# Patient Record
(demographics unavailable — no encounter records)

---

## 2024-10-23 NOTE — REVIEW OF SYSTEMS
[Fatigue] : fatigue [SOB on Exertion] : shortness of breath during exertion [Negative] : Allergic/Immunologic [Recent Change In Weight] : ~T no recent weight change [Shortness Of Breath] : no shortness of breath [Wheezing] : no wheezing [Cough] : no cough [Skin Rash] : no skin rash [FreeTextEntry2] : occasional  [FreeTextEntry6] : wheeze and cough- improved

## 2024-10-23 NOTE — ASSESSMENT
[Palliative] : Goals of care discussed with patient: Palliative [FreeTextEntry1] : 68 yo m with Stage IIIb sq cell ca, PDL1 0%.  -s/p CCRT -started maintenance therapy with durvalumab 2/25/20.Developed vesicular rash originally thought to be shingles. Was treated with valacyclovir. This then progressed to disseminated plaque and blister rash which was painful. Dx with bullous pemphigoid. Pt was started on Dupixent by derm with improvement in symptoms. Follow up with derm as scheduled. Pt continues on Dupixent every 2 weeks and is off steroids. Subsequently developed COVID in January and was hospitalized 1/21/21-1/26/21.  Immunotherapy permanently discontinued given severe AEs Scans done 4/15/21 as follows Unchanged right hilar mass and nodular right upper lobe mass. Minimal residual ground glass opacities and interlobular septal thickening improved since 01/22/2021. Emphysema.  Indeterminant right renal hypodensity. Suggest further evaluation with MRI. Infrarenal abdominal aortic aneurysm Pt was lost to follow up after that He is here now after being diagnosed with recurrent disease He was admitted to [facility] on 4/24 with acute hypoxic resp failure- imaging moore demonstrated increased size of R perihilar mass a/w severe narrowing of the R mainstem bronchus. Interventional Pulm was consulted s/p Flex Bronch 4/25 s/p balloon dilation, tumor debunking and BI [id number] Bonastent placement; EBUS guided R hilar mass bx done 4/25/24 bx of right hilar mass consistent with NSCLC, squamous cell carcinoma. PET/CT and MR head reviewed- FDG avid hilar/med mass, no other areas of disease He was started on carbo (retreat) AUC 4 plus Taxol 175 mg/m2 in May 2024 Pt has completed 4 cycles of carbo (retreat) AUC 4 plus Taxol 175 mg/m2  CT scans from this month show decreased size of the right hilar mass.  Case presented at  Palliative intent RT was recommended with goal of locoregional disease control Ideally, this should be followed by IO. 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  For now will hold off on IO.  Plan to obtain PET-CT in December. If there are new areas of disease, will consider IO.  Follow up with Dr. Bradshaw after RT completes Cough; continue benzonatate to 200mg TID. Will try hycodon as well  Fatigue: Voice also sounds a bit raspy. Will obtain RPP to r/o URI   OV in 4 weeks

## 2024-10-23 NOTE — SURGICAL HISTORY
patient's belongings returned [PSH Reviewed and Updated] : past surgical history reviewed and updated

## 2024-10-23 NOTE — PHYSICAL EXAM
[Fully active, able to carry on all pre-disease performance without restriction] : Status 0 - Fully active, able to carry on all pre-disease performance without restriction [Normal] : affect appropriate [de-identified] : Patchy healing rash

## 2024-10-24 NOTE — DATA REVIEWED
[FreeTextEntry1] : CT scan 7/26/24   IMPRESSION: 1.  Interval decrease in size of the obstructive right hilar mass measuring 5.5 x 4.0 cm, previously 6.8 x 4.3 cm on 4/24/2024. 2.  New complete consolidation of the right upper and middle lobe. Right middle lobe consolidation with volume loss, likely represents postobstructive atelectasis. The consolidation of the right upper lobe however demonstrates heterogeneous enhancement with lobular configuration. Differentials include postobstructive lung field with fluid/pneumonia or tumor extension. 3.  Stable partially thrombosed infrarenal abdominal aortic aneurysm.

## 2024-10-24 NOTE — HISTORY OF PRESENT ILLNESS
[Patient is stable] : patient is stable [FreeTextEntry2] : 68 yo man, former heavy smoker (100 py), with h/o COPD, Covid-19 infection, HTN, GERD, anxiety/depression, and stage 4 T4, N3, M1  SCC of the lung.   Lung Ca- dx in 2019 with dominant R hilar mass with encasement of R pulm arteriesta rad and chemo then Durvalumab.  4/24 hospitalized with acute hypoxic resp failure- imaging moore demonstrated increased size of R perihilar mass a/w severe narrowing of the R mainstem bronchus s/p balloon dilation, tumor debunking and BI [id number] Bonastent placement;  infxn treated for pneumonia Finished RT x5 treatments. COPD- on Breo and duonebs. Has home O2, not needing frequently.  Hydrocodone cough syrup not covered. Tylenol #3 was helpful for cough, needs refill Poor appetite. Lost 5 lb, unintentional Had repeat bronchosopy-- showed inflamation and mucus plug.    Still independent in ADL's

## 2024-10-24 NOTE — HISTORY OF PRESENT ILLNESS
[Patient is stable] : patient is stable [FreeTextEntry2] : 70 yo man, former heavy smoker (100 py), with h/o COPD, Covid-19 infection, HTN, GERD, anxiety/depression, and stage 4 T4, N3, M1  SCC of the lung.   Lung Ca- dx in 2019 with dominant R hilar mass with encasement of R pulm arteriesta rad and chemo then Durvalumab.  4/24 hospitalized with acute hypoxic resp failure- imaging moore demonstrated increased size of R perihilar mass a/w severe narrowing of the R mainstem bronchus s/p balloon dilation, tumor debunking and BI [id number] Bonastent placement;  infxn treated for pneumonia Finished RT x5 treatments. COPD- on Breo and duonebs. Has home O2, not needing frequently.  Hydrocodone cough syrup not covered. Tylenol #3 was helpful for cough, needs refill Poor appetite. Lost 5 lb, unintentional Had repeat bronchosopy-- showed inflamation and mucus plug.    Still independent in ADL's

## 2024-10-24 NOTE — PHYSICAL EXAM
[No Acute Distress] : no acute distress [Normal Voice/Communication] : normal voice communication [Normal Outer Ear/Nose] : the ears and nose were normal in appearance [No JVD] : no jugular venous distention [No Respiratory Distress] : no respiratory distress [Normal Rate] : heart rate was normal  [Regular Rhythm] : with a regular rhythm [Normal Bowel Sounds] : normal bowel sounds [Non Tender] : non-tender [Normal Gait] : normal gait [No Rash] : no rash [No Skin Lesions] : no skin lesions [Oriented x3] : oriented to person, place, and time [Normal Affect] : the affect was normal [de-identified] : no wheezing, Bilateral air entry [de-identified] : no murmur, no edema

## 2024-10-24 NOTE — PHYSICAL EXAM
History   Chief Complaint:  Chest Pain       The history is provided by the patient.      Fouzia Arenas is a 57 year old female with history of hypertension who presents with chest pain. The patient was recently seen at [facility] on 8/24 for chest pain and shortness of breath on exertion where labs including trop and D dimer were found to be normal. She also took a stress test today that was found to be negative with an EF of 76%. Several hours after the stress test today, the patient began experiencing nausea with centralized chest pain/ heaviness and around 1900 while making dinner,  She also notes that about that same time she walked across the house to get her phone and felt extremely short of breath which prompted her to call her friend. She states that the symptoms are similar to what brought her in to the ED in August. She denies any cough or fever.      Review of Systems   Constitutional: Negative for fever.   Respiratory: Positive for shortness of breath. Negative for cough.         (+) chest heaviness   Cardiovascular: Positive for chest pain.   Gastrointestinal: Positive for nausea.   All other systems reviewed and are negative.      Allergies:  Amoxicillin  Hctz [Hydrochlorothiazide]  Percocet [Oxycodone-Acetaminophen]    Medications:  Zestril  Antivert  Myrbetriq  Bystolic  Aldactone  Imitrex  Cholecalciferol    Past Medical History:     Esophageal reflux  Palpitations  STORMY  BPPV  Morbid obesity  Vertigo  Hypertension  SVT  Lipodermatosclerosis of both lower extremities  Migraine     Past Surgical History:    Bladder surgery  Cholecystectomy  Colonoscopy  Cystoscopy, sling transvaginal  D&C  EGD  Salpingectomy  Laser ablation vein varicose  Hysterectomy     Family History:    Mother - hypertension, lipids, alzheimer disease  Father - hypertension, prostate cancer, lipids    Social History:  Patient came from home.  Patient is accompanied in the ED with friend.  PCP: Ruthie Xiong      Physical Exam     Patient Vitals for the past 24 hrs:   BP Temp Temp src Pulse Resp SpO2 Weight   09/14/22 0200 (!) 157/99 98.1  F (36.7  C) Oral (!) 48 14 97 % --   09/14/22 0100 (!) 150/84 -- -- (!) 46 17 96 % --   09/14/22 0000 (!) 153/76 -- -- (!) 46 19 95 % --   09/13/22 2328 (!) 150/87 97.7  F (36.5  C) Oral (!) 48 18 99 % --   09/13/22 2034 (!) 172/92 97.9  F (36.6  C) Oral 65 20 100 % 145.2 kg (320 lb)       Physical Exam  General/Appearance: appears stated age, well-groomed, appears comfortable  Eyes: EOMI, no scleral injection, no icterus  ENT: MMM  Neck: supple, nl ROM, no stiffness  Cardiovascular: RRR, nl S1S2, no m/r/g, 2+ pulses in all 4 extremities, cap refill <2sec  Respiratory: CTAB, good air movement throughout, no wheezes/rhonchi/rales, no increased WOB, no retractions  GI: abd soft, non-distended, nttp,  no HSM, no rebound, no guarding, nl BS  MSK: STEVEN, good tone, no bony abnormality  Skin: warm and well-perfused, no rash, no edema, no ecchymosis, nl turgor  Neuro: GCS 15, alert and oriented, no gross focal neuro deficits  Psych: interacts appropriately  Heme: no petechia, no purpura, no active bleeding        Emergency Department Course   ECG  ECG results from 09/13/22   EKG 12-lead, tracing only     Value    Systolic Blood Pressure     Diastolic Blood Pressure     Ventricular Rate 64    Atrial Rate 64    ME Interval 186    QRS Duration 94        QTc 429    P Axis 57    R AXIS -18    T Axis -12    Interpretation ECG      Sinus rhythm with marked sinus arrhythmia  Low voltage QRS  Cannot rule out Anterior infarct (cited on or before 13-SEP-2022)  T wave abnormality, consider inferior ischemia  Abnormal ECG  When compared with ECG of 13-SEP-2022 11:10,  Questionable change in QRS axis  Confirmed by GENERATED REPORT, COMPUTER ([id number]),  Aasen, Bradley ([id number]) on 9/13/2022 8:42:54 PM         Imaging:  XR Chest 2 Views   Final Result   IMPRESSION: Lungs are clear. No pleural effusion or  pneumothorax.      Upper limits of normal heart size.        Report per radiology    Laboratory:  Labs Ordered and Resulted from Time of ED Arrival to Time of ED Departure   BASIC METABOLIC PANEL - Abnormal       Result Value    Sodium 137      Potassium 4.2      Chloride 105      Carbon Dioxide (CO2) 26      Anion Gap 6      Urea Nitrogen 16      Creatinine 0.99      Calcium 9.2      Glucose 125 (*)     GFR Estimate 66     CBC WITH PLATELETS AND DIFFERENTIAL - Abnormal    WBC Count 7.7      RBC Count 5.11      Hemoglobin 14.4      Hematocrit 46.1      MCV 90      MCH 28.2      MCHC 31.2 (*)     RDW 12.5      Platelet Count 267      % Neutrophils 52      % Lymphocytes 37      % Monocytes 8      % Eosinophils 2      % Basophils 1      % Immature Granulocytes 0      NRBCs per 100 WBC 0      Absolute Neutrophils 4.0      Absolute Lymphocytes 2.9      Absolute Monocytes 0.6      Absolute Eosinophils 0.1      Absolute Basophils 0.1      Absolute Immature Granulocytes 0.0      Absolute NRBCs 0.0     TROPONIN I - Normal    Troponin I High Sensitivity 7     D DIMER QUANTITATIVE - Normal    D-Dimer Quantitative <0.27     NT PROBNP INPATIENT - Normal    N terminal Pro BNP Inpatient 297     TROPONIN I - Normal    Troponin I High Sensitivity 7        Emergency Department Course:       Reviewed:  I reviewed nursing notes, vitals, past medical history and Care Everywhere    Assessments:  2254 I obtained history and examined the patient as noted above.   0303 I rechecked the patient and explained findings.     Disposition:  The patient was discharged to home.     Impression & Plan     CMS Diagnoses: None    Medical Decision Making:  This patient is a pleasant 57-year-old female who presents with several hours of chest pain/heaviness as well as some dyspnea on exertion today.  This occurs in light of a normal stress test that she had earlier today with an EF of approximately 76%.  Here to high-sensitivity delta troponins were  unremarkable as was her EKG.  Chest x-ray was clear.  D-dimer was negative.  Clinically she is stable.  At this point in time I do not know was causing her symptoms and do want her to follow-up with her PCP as they may ultimately feel is important that she sees cardiology but from an ER standpoint I think we have looked for and evaluated for life-threatening causes, rule these out, and it safe for her to be sent home with supportive care.  The patient feels comfortable with these plans.  Diagnosis:    ICD-10-CM    1. Chest pain, unspecified type  R07.9    2. SOB (shortness of breath)  R06.02        Discharge Medications:  New Prescriptions    No medications on file       Scribe Disclosure:  I, Nick Lozano, am serving as a scribe at 10:52 PM on 9/13/2022 to document services personally performed by Peggy Espitia MD based on my observations and the provider's statements to me.          Peggy Espitia MD  09/14/22 0787     [No Acute Distress] : no acute distress [Normal Voice/Communication] : normal voice communication [Normal Outer Ear/Nose] : the ears and nose were normal in appearance [No JVD] : no jugular venous distention [No Respiratory Distress] : no respiratory distress [Normal Rate] : heart rate was normal  [Regular Rhythm] : with a regular rhythm [Normal Bowel Sounds] : normal bowel sounds [Non Tender] : non-tender [Normal Gait] : normal gait [No Rash] : no rash [No Skin Lesions] : no skin lesions [Oriented x3] : oriented to person, place, and time [Normal Affect] : the affect was normal [de-identified] : no wheezing, Bilateral air entry [de-identified] : no murmur, no edema

## 2024-10-24 NOTE — REASON FOR VISIT
[Follow-Up] : a follow-up visit [Pre-Visit Preparation] : pre-visit preparation was done [FreeTextEntry1] : lung CA [FreeTextEntry2] : reviewed chart

## 2024-10-24 NOTE — HEALTH RISK ASSESSMENT
[Independent] : feeding [No falls in past year] : Patient reported no falls in the past year [No] : The patient does not have visual impairment [TimeGetUpGo] : 10

## 2024-10-24 NOTE — ASSESSMENT
[FreeTextEntry1] : Discussed ACP- pt wants trial CPR. Undecided about rest of MOLST. Pt has not yet discussed with family, kiki brother who is HCP.  Will address next visit

## 2024-10-30 NOTE — REVIEW OF SYSTEMS
[Dyspepsia: Grade 0] : Dyspepsia: Grade 0 [Dysphagia: Grade 0] : Dysphagia: Grade 0 [Esophagitis: Grade 0] : Esophagitis: Grade 0 [Nausea: Grade 0] : Nausea: Grade 0 [Vomiting: Grade 0] : Vomiting: Grade 0 [Fatigue: Grade 1 - Fatigue relieved by rest] : Fatigue: Grade 1 - Fatigue relieved by rest [Cough: Grade 2 - Moderate symptoms, medical intervention indicated; limiting instrumental ADL] : Cough: Grade 2 - Moderate symptoms, medical intervention indicated; limiting instrumental ADL [Dyspnea: Grade 2 - Shortness of breath with minimal exertion; limiting instrumental ADL] : Dyspnea: Grade 2 - Shortness of breath with minimal exertion; limiting instrumental ADL [Hoarseness: Grade 1 - Mild or intermittent voice change; fully understandable; self-resolves] : Hoarseness: Grade 1 - Mild or intermittent voice change; fully understandable; self-resolves [Hypoxia: Grade 2 - Decreased oxygen saturation with exercise (e.g., pulse oximeter <88%); intermittent supplemental oxygen] : Hypoxia: Grade 2 - Decreased oxygen saturation with exercise (e.g., pulse oximeter <88%); intermittent supplemental oxygen [Pruritus: Grade 0] : Pruritus: Grade 0 [Skin Hyperpigmentation: Grade 0] : Skin Hyperpigmentation: Grade 0 [Dermatitis Radiation: Grade 0] : Dermatitis Radiation: Grade 0 [FreeTextEntry1] : continues w/o change  [FreeTextEntry2] : uses home O2 @ 2 LPM as needed [FreeTextEntry5] : has home 02 as needed

## 2024-10-30 NOTE — HISTORY OF PRESENT ILLNESS
[FreeTextEntry1] : 6/19/24: Initial consultation:  Clinically stable s/p stent for Rt perihilar recurrence and currently on systemic therapy. We discussed possible palliative radiation for local control, but given stable clinical findings currently, will continue on systemic therapy and await f/u scans first before finalizing any further radiation vs observation.  8/15/24: Since prior visit has had a positive response to systemic therapy without progression although with post-obstructive atelectasis. Given this, we discussed adding radiation therapy for local control, although further radiation is limited given the prior treatment in the past. He wishes to do as much as possible, so planned for 20Gy/5fx which he received from 9/13/2024-9/19/2024.  10/30/2024: Patient presents to clinic for follow-up s/p SBRT [id number] to the right lung completed on 9/19/2024. He had an XRAY  of the chest performed on 10/23/2024 Impression: No acute pulmonary disease or interval change. s/p bronch 9/25/2024 RLL, pathology neg for malignant cells.  Coughing continues, denies fevers/chills. Finds Benzonatate that was prescribed helpful. Using nebulizer twice daily. States his SOB/GARCIAS are at his baseline without change. Continues to use home O2 as needed but states he uses it infrequently.

## 2024-11-19 NOTE — REVIEW OF SYSTEMS
[Fatigue] : fatigue [SOB on Exertion] : shortness of breath during exertion [Negative] : Allergic/Immunologic [Recent Change In Weight] : ~T no recent weight change [Shortness Of Breath] : no shortness of breath [Wheezing] : no wheezing [Cough] : no cough [Skin Rash] : no skin rash [FreeTextEntry2] : occasional  [FreeTextEntry6] : wheeze and cough- improved [de-identified] : as above

## 2024-11-19 NOTE — HISTORY OF PRESENT ILLNESS
[Disease: _____________________] : Disease: [unfilled] [T: ___] : T[unfilled] [N: ___] : N[unfilled] [M: ___] : M[unfilled] [AJCC Stage: ____] : AJCC Stage: [unfilled] [Date: ____________] : Patient's last distress assessment performed on [unfilled]. [8 - Distress Level] : Distress Level: 8 [de-identified] : 69 year old male, with past history significant for Hypertension (untreated), GERD, Anxiety, Depression and Smoking (active), COPD, presented to the ED at [facility] on 9/5/19 with progressive shortness of breath and cough - with wheezing and occasional hemoptysis for the past ~ one month. Reports pleuritic type pains of the right lower chest and back with coughing and repositioning of the body. Patient has had an unquantified weight loss over several months (5 lbs per patient, 10 lbs per nephew). CXR at [facility] showed a lung mass. CTA of chest significant for "5.5 x 4.7 cm sized right hilar mass with mediastinal invasion. Encasement of right pulmonary artery and lobar branches as well as central airways with mucosal plug. Possible endobronchial spread to the right upper lobe." Pt was transferred to [facility] for thoracic surgery eval. Patient was seen by thoracic surgery and deemed not a surgical candidate. CT A/P showed scattered liver lesions. Pulmonology was consulted and EBUS with biopsy of the lung mass was performed on 9/10. Rapid cytology showed malignant cells. Further IHC analysis showed P40 pos and TTF1 negative, confirming sq cell histology. PDL1 was 0%.  Of note, brain MRI was normal.  Since discharge, pt has been feeling tired, and continues to have cough and dyspnea. He is trying to maintaining weight.   Pt is a recent smoker. Smoked 2 ppd of cigarettes for ~ 50 years. Last cigarette was on 9/5- has Nictonine patch now. Pt has not seen a doctor for 25 years prior to the recent admission and has not had any prior CT scans.   10/3/19: Not smoking., Started chemo on Tuesday., Feels better already- still some cough. Gained weight. No AE  10/22/19: Pt feeling well. Continued cough. Small weight loss. States appetite fluctuates. Not smoking. Difficulty sleeping due to anxiety and worry.   11/14/19: Pt returns for follow-up. He has completed 7 weeks of carbo/abraxane and is scheduled for XRT sim tomorrow. He will begin CCRT with same agents. He is feeling well. Gained weight. MIld fatigue. Cough improved. denies pain  1/31/2020: Pt returns for follow up. He completed CCRT on 1/3/2020. States he is feeling well. Denies pain/cough/SOB. Mild residual fatigue from time to time. Reports he QUIT SMOKING!!! It has been 2 months since he had a cigarette. States appetite is good despite no weight gain.   5/5/2020: Pt being seen in treatment room today. He is feeling very well. Offers no complaints. Has been on durvalumab since 2/25/2020 and is tolerating welll. Due to Covid Pandemic regimen has been changed to Q month schedule. He denies cough, SOB, headaches, visual changes. S/P CCRT on 1/3/2020.  6/16/20: No new complaints. HAd CT scans last week, Creatinine elevated on recent blood tests. Pt denies any NSAIDs  7/14/2020: Pt returns for follow up and treatment. He is doing well. Tolerating tx wo any iAE. Pt has been on Durvalumab since 2/25/2020. He will complete one year of therapy. Last imaging in June. No new complaints. ROS: non-contributory.   8/11/20: No complaints. Creatinine still elevated. Saw renal and bx was recommended to r/o possible immune related kidney injury  11/3/2020: Pt doing well. Complains of chronic heartburn. Using rolaids/tums. States has no follow up scheduled with nephrology. Pt states has never had an endoscopy or colonoscopy.   12/1/2020: Pt returns for follow up and discussion of findings on recent imaging. He states he is feeling well but has developed a rash on his RUE that is painful and itchy.   12/29/2020: Pt returns for follow up. Last immunotherapy held due to possible irAE skin toxicity. Started on prednisone 40mg daily. Pt has since developed severe plaque/blister rash. Saw derm had biopsy...results pending. Pt developed profound SOB/ Cough. No fevers.  4/8/21: Pt returns for follow up. Since last visit patient has had multiple admissions to the hospital. In dec he was admitted for severe immune-mediated skin and lung toxicity. He was seen by derm and diagnosed with bullous pemphigoid. He continues to follow with derm and was recently started on Dupixant. He remains on Prednisone 10mg daily. Shortly after discharge he developed severe COVID infection and was hospitalized again 1/21/21-1/26/21. He was evaluated by the [facility] but has not maintained follow up. She continues to struggle with profound fatigue and significant GARCIAS. He has been off immunotherapy since 12/15/20.   5/13/21: Cutaneous lesions have markedly improved. Continues to follow with Dr. Bradshaw. Gained weight. Feels well. No complaints  9/17/21: Cutaneous lesions continue to improve- right now just some induration and discoloration. Appetite good and gained weight.   5/17/24: Pt was lost to follow up. He was most recently admitted to [facility] on 4/24 with acute hypoxic resp failure- imaging moore demonstrated increased size of R perihilar mass a/w severe narrowing of the R mainstem bronchus. Interventional Pulm was consulted s/p Flex Bronch 4/25 s/p balloon dilation, tumor debunking and BI [id number] Bonastent placement; EBUS guided R hilar mass bx done. bx of right hilar mass consistent with NSCLC, squamous cell carcinoma.  CTa chest 4/25 showed a 6.8 x 4.3 cm R perihilar mass that had increased in size. CT A/P from 5/1 showed no evidence disease in the abdomen or pelvis.  Seen by rad onc inpatient and nothing to be done at that time, outpatient palliative RT. Scheduled to see med onc 5/17/24. He complains of fatigue, weakness and mild intermittent discomfort in the R chest. He also complains of a productive cough, but improved sob and wheeze. He denies hemoptysis and dysphagia, currently tolerating diet well.    6/14/24: Pt is feeling well. he has decreased appetite and has lost some weight. cough and dyspnea improved. Voice has improved/   7/5/24: Patient seen today for follow up while receiving treatment. He reports some fatigue and constipation that is relieved with docusate following chemo. He has also been having cough. Last week he had bronch and stent was cleaned. Appetite is stable, no N/V/D.   7/26/24: Patient seen today for follow up while receiving treatment. He reports overall feeling well. Breathing is stable and he denies N/V/D.   8/15/24: Doing well. Feeling better. Respiratory symptoms have improved.. He has gained weight. Completed 4 cycle of chemo (Carbo/taxol)  9/24/24: Patient seen today for follow up. He has completed 5 frx of RT to the right lung. He reports experiencing fatigue following RT. He continues to have cough despite benzonatate. He experiences SOB when coughing but otherwise breathing is stable. He denies N/V/D, F/C.   10/23/24: Patient seen today for follow up. He reports that he is having a slight intermittent discomfort on the anterior right chest wall that predated RT. He has ongoing cough benzonatate is somewhat helpful. He reports feeling fatigue and tired the past two days but denies rhinorrhea, sore throat, fever, chills, changes in his breathing. He reached out to pulm yesterday and was ordered a CXR which he will have done after our visit today.   11/19/24: Pt here today for follow up. He was recently hospitalized post fall and trauma to head. He underwent some work up. PE study which was negative. CT head with no acute findings. He is home now, and has not had recurrence of the symptoms.  [de-identified] : sq cell ca

## 2024-11-19 NOTE — ASSESSMENT
[Palliative] : Goals of care discussed with patient: Palliative [FreeTextEntry1] : 68 yo m with Stage IIIb sq cell ca, PDL1 0%.  -s/p CCRT -started maintenance therapy with durvalumab 2/25/20.Developed vesicular rash originally thought to be shingles. Was treated with valacyclovir. This then progressed to disseminated plaque and blister rash which was painful. Dx with bullous pemphigoid. Pt was started on Dupixent by derm with improvement in symptoms. Follow up with derm as scheduled. Pt continues on Dupixent every 2 weeks and is off steroids. Subsequently developed COVID in January and was hospitalized 1/21/21-1/26/21.  Immunotherapy permanently discontinued given severe AEs Scans done 4/15/21 as follows Unchanged right hilar mass and nodular right upper lobe mass. Minimal residual ground glass opacities and interlobular septal thickening improved since 01/22/2021. Emphysema.  Indeterminant right renal hypodensity. Suggest further evaluation with MRI. Infrarenal abdominal aortic aneurysm Pt was lost to follow up after that He is here now after being diagnosed with recurrent disease He was admitted to [facility] on 4/24 with acute hypoxic resp failure- imaging moore demonstrated increased size of R perihilar mass a/w severe narrowing of the R mainstem bronchus. Interventional Pulm was consulted s/p Flex Bronch 4/25 s/p balloon dilation, tumor debunking and BI [id number] Bonastent placement; EBUS guided R hilar mass bx done 4/25/24 bx of right hilar mass consistent with NSCLC, squamous cell carcinoma. PET/CT and MR head reviewed- FDG avid hilar/med mass, no other areas of disease He was started on carbo (retreat) AUC 4 plus Taxol 175 mg/m2 in May 2024 Pt has completed 4 cycles of carbo (retreat) AUC 4 plus Taxol 175 mg/m2  CT scans from this month show decreased size of the right hilar mass.  Case presented at  Palliative intent RT was recommended with goal of locoregional disease control Ideally, this should be followed by IO. 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  For now will hold off on IO.  Plan to obtain PET-CT in December. If there are new areas of disease, will consider IO.  Follow up with Dr. Bradshaw after RT completes Cough; continue benzonatate to 200mg TID. Will try hycodon as well  Fatigue: Voice also sounds a bit raspy. Will obtain RPP to r/o URI   OV in 4 weeks   11/19/24:  Pt completed RT, 20Gy/5fx which he received from 9/13/2024-9/19/2024. Was gradually improving but had a syncopal event 11/15. No seizures, urinary or fecal incontinence during the episode.  Was hosptialized I reviewed the EMR in its entirety including notes from other providers, labs, path, and scan reports I reviewed imaging studies independently CTA showed no PE CT head showed no bleed or other acute pathologies Pt to follow up with pulm- has persistent dyspne Syncope- will need to r/o cardiac etiology. Will get echo- refer to cardiology Refer to neuro given concern that this could be neurological MR head ASAP Next scan would be in Jan and should be PET/CT OV in 3 weeks

## 2024-11-19 NOTE — PHYSICAL EXAM
[Normal] : affect appropriate [Restricted in physically strenuous activity but ambulatory and able to carry out work of a light or sedentary nature] : Status 1- Restricted in physically strenuous activity but ambulatory and able to carry out work of a light or sedentary nature, e.g., light house work, office work [de-identified] : Patchy healing rash

## 2024-11-24 NOTE — PHYSICAL EXAM
[No Acute Distress] : no acute distress [Normal Oropharynx] : normal oropharynx [Normal Appearance] : normal appearance [Normal Rate/Rhythm] : normal rate/rhythm [Normal S1, S2] : normal s1, s2 [No Resp Distress] : no resp distress [No Acc Muscle Use] : no acc muscle use [Clear to Auscultation Bilaterally] : clear to auscultation bilaterally [No Abnormalities] : no abnormalities [Benign] : benign [Normal Gait] : normal gait [No Clubbing] : no clubbing [No Edema] : no edema [Normal Color/ Pigmentation] : normal color/ pigmentation [No Focal Deficits] : no focal deficits [Oriented x3] : oriented x3 [Normal Mood] : normal mood [Normal Affect] : normal affect

## 2024-12-01 NOTE — HISTORY OF PRESENT ILLNESS
[Former] : former [TextBox_4] : Interventional Pulmonology Consultation/Visit Note  Mr. Manning is a 69 year old man with 100 pack year smoking hx, as well as COPD, HTN, GERD, anxiety/depression, and stage IIIB (T4N2M0) SCC of the lung dx in 2019 with dominant R hilar mass with encasement of R pulm arteries. He completed definitive ChemoRT with good response (60Gy/30 fx completed in 1/2020) followed by maintenance Durvalumab- however was stopped in 12/2020 due to AEs. Patient was lost to follow up in 2021.  He was most recently admitted to [facility] on 4/24 with acute hypoxic resp failure- imaging moore demonstrated increased size of R perihilar mass a/w severe narrowing of the R mainstem bronchus. Interventional Pulm was consulted s/p Flex Bronch 4/25 s/p balloon dilation, tumor debunking and BI [id number] Bonastent placement; EBUS guided R hilar mass bx done  4/25/24 bx of right hilar mass consistent with NSCLC, squamous cell carcinoma.  CTa chest 4/25 showed a 6.8 x 4.3 cm R perihilar mass that had increased in size.  CT A/P from 5/1 showed no evidence disease in the abdomen or pelvis.  Seen by rad onc inpatient and nothing to be done at that time, outpatient palliative RT. Scheduled to see med onc 5/17/24.  5/6/24 initial radiation consult: Patient here today with nephew. He is feeling well overall butpp is having some worsening symptoms. He complains of fatigue, weakness and mild intermittent discomfort in the R chest. He also complains of a productive cough, with sob and worsening GARCIAS. He denies hemoptysis and dysphagia, currently tolerating diet well.  Pt was seen by IP 06/26/2024 where he had a Bronchoscopy done for stent evaluation result from that bronchoscopy was notable for: - Fibrin, blood, and scanty fragments of epithelium with squamous metaplasia.  Negative for malignancy.  Patient id following with IP for a routine follow up for stent management.  At this time patient is endorsing that he is feeling well status post chemotherapy, and he is to start radiation therapy on 09/12-13/2024 and 09/16-19/2024. Outside of his ongoing cough patient denies having any chest pain, hemoptysis, dyspnea at rest, dyspnea on exertion, pleuritic pain, wheezing, stridor or other obvious pulmonary symptoms. Rest of the review of systems is negative apart from those findings mentioned above.  Is due for surveillance bronchoscopy. If compliant with airway regimen.

## 2024-12-01 NOTE — ASSESSMENT
[FreeTextEntry1] : 70 y/o M with recurrence of non-small cell lung cancer, presenting to the hospital with profound respiratory failure, requiring noninvasive ventilation. Patient underwent emergent flexible bronchoscopy with stent placement and had significant relief of symptoms post and was discharged. Now feels much better. He had previous received treatment a few years ago but then abruptly stopped without any follow up. Now getting systemic therapy, due to start radiation.   Presents to clinic for follow up. Today we discussed that we will plan for surveillance bronchoscopy in 1-2 weeks to both evaluate stent patency, any stent related complications such as mucus plugging, granulation tissue, and possible stent removal. Patient stent education performed. Advised to contact service if any new clinical symptoms. Advised to present to the ER for evaluation if any of the following symptoms noted: New or increased shortness of breath, new or increased chest pain, new or increased cough, new or increased hoarseness of loss of voice. Patient demonstrated understanding with verbal confirmation. Importance of adherence to stent regimen discussed which includes (3% saline nebulization X 3 times a day, Breo Ellipta 200-25 mcg/act inhalation QD, Albuterol nebulization 3 times a day, airway clearance).  The risk and benefits of flex bronchoscopy with stent evaluation including risk of bleeding and risk pneumothorax was discussed with the patient and they demonstrated understanding. In case of pneumothorax, we discussed the need for in hospital monitoring and chest tube placement. In case of bleeding, we explained that they may require inpatient or ICU admission if persistent. Educational reading material regarding the procedure, risks and benefits provided to the patient in paper format.

## 2024-12-01 NOTE — HISTORY OF PRESENT ILLNESS
[Former] : former [TextBox_4] : Interventional Pulmonology Consultation/Visit Note  Mr. Manning is a 69 year old man with 100 pack year smoking hx, as well as COPD, HTN, GERD, anxiety/depression, and stage IIIB (T4N2M0) SCC of the lung dx in 2019 with dominant R hilar mass with encasement of R pulm arteries. He completed definitive ChemoRT with good response (60Gy/30 fx completed in 1/2020) followed by maintenance Durvalumab- however was stopped in 12/2020 due to AEs. Patient was lost to follow up in 2021.  He was most recently admitted to [facility] on 4/24 with acute hypoxic resp failure- imaging moore demonstrated increased size of R perihilar mass a/w severe narrowing of the R mainstem bronchus. Interventional Pulm was consulted s/p Flex Bronch 4/25 s/p balloon dilation, tumor debunking and BI [id number] Bonastent placement; EBUS guided R hilar mass bx done  4/25/24 bx of right hilar mass consistent with NSCLC, squamous cell carcinoma.  CTa chest 4/25 showed a 6.8 x 4.3 cm R perihilar mass that had increased in size.  CT A/P from 5/1 showed no evidence disease in the abdomen or pelvis.  Seen by rad onc inpatient and nothing to be done at that time, outpatient palliative RT. Scheduled to see med onc 5/17/24.  5/6/24 initial radiation consult: Patient here today with nephew. He is feeling well overall butpp is having some worsening symptoms. He complains of fatigue, weakness and mild intermittent discomfort in the R chest. He also complains of a productive cough, with sob and worsening GARCIAS. He denies hemoptysis and dysphagia, currently tolerating diet well.  Pt was seen by IP 06/26/2024 where he had a Bronchoscopy done for stent evaluation result from that bronchoscopy was notable for: - Fibrin, blood, and scanty fragments of epithelium with squamous metaplasia.  Negative for malignancy.  Patient id following with IP for a routine follow up for stent management.  At this time patient is endorsing that he is feeling well status post chemotherapy, and he is to start radiation therapy on 09/12-13/2024 and 09/16-19/2024. Outside of his ongoing cough patient denies having any chest pain, hemoptysis, dyspnea at rest, dyspnea on exertion, pleuritic pain, wheezing, stridor or other obvious pulmonary symptoms. Rest of the review of systems is negative apart from those findings mentioned above.  Is due for surveillance bronchoscopy. If compliant with airway regimen.

## 2024-12-01 NOTE — ASSESSMENT
[FreeTextEntry1] : 68 y/o M with recurrence of non-small cell lung cancer, presenting to the hospital with profound respiratory failure, requiring noninvasive ventilation. Patient underwent emergent flexible bronchoscopy with stent placement and had significant relief of symptoms post and was discharged. Now feels much better. He had previous received treatment a few years ago but then abruptly stopped without any follow up. Now getting systemic therapy, due to start radiation.   Presents to clinic for follow up. Today we discussed that we will plan for surveillance bronchoscopy in 1-2 weeks to both evaluate stent patency, any stent related complications such as mucus plugging, granulation tissue, and possible stent removal. Patient stent education performed. Advised to contact service if any new clinical symptoms. Advised to present to the ER for evaluation if any of the following symptoms noted: New or increased shortness of breath, new or increased chest pain, new or increased cough, new or increased hoarseness of loss of voice. Patient demonstrated understanding with verbal confirmation. Importance of adherence to stent regimen discussed which includes (3% saline nebulization X 3 times a day, Breo Ellipta 200-25 mcg/act inhalation QD, Albuterol nebulization 3 times a day, airway clearance).  The risk and benefits of flex bronchoscopy with stent evaluation including risk of bleeding and risk pneumothorax was discussed with the patient and they demonstrated understanding. In case of pneumothorax, we discussed the need for in hospital monitoring and chest tube placement. In case of bleeding, we explained that they may require inpatient or ICU admission if persistent. Educational reading material regarding the procedure, risks and benefits provided to the patient in paper format.

## 2024-12-04 NOTE — REASON FOR VISIT
[FreeTextEntry3] : Dr. Boles [FreeTextEntry1] : ------------------------------------------------------------------------ SHARLENE OSBORNE is a 69 year old man with HTN, COPD, with NSC lung cancer complicated by IO toxicity (skin, pulmonary) , ascending aortic aneurysm who is seen for syncope.  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

## 2024-12-04 NOTE — HISTORY OF PRESENT ILLNESS
[FreeTextEntry1] : The patient was referred for evaluation of a recent fainting episode. Two weeks ago (in 2024) while at home, he passed out for a few seconds, hit his head, and was taken to [facility]. He feels the episode was related to dehydration or breathing issues. He reports no prodrome and no palpitations. He regained consciousness after a few seconds and was on the floor. He did not sustain serious trauma or injury.  He has no prior history of syncope or heart problems. But his primary care doctor recommended he see a cardiologist for an "enlarged heart." His ECGs have shown LVH with repolarization abnormalities since prior to initiating cancer treatment.  He was diagnosed with non-small cell lung cancer in , treated with chemotherapy and radiation, and has been off chemotherapy since 2024. The patient .previously received immunotherapy with durvalumab but discontinued due to skin (bullous pemphigoid) and lung issues. The patient has a chronic cough, and recently underwent an endobronchial stenting procedure.  Past Medical History: - History of immunotherapy (Durvalumab) with skin and lung reactions. - not currently on systemic immunosuppression - Stage 3 chronic kidney disease - History of high blood pressure - Previous cyst removal from the scalp 30 years ago - dilated aortic root noted on echocardiogram, 4.3 cm  Social History: Lives in [facility] with two sisters. Retired . Former smoker, quit at time of cancer diagnosis ()  Family History: No family history of early/premature CAD He is not aware of any family history of cardiomyopathy or early/sudden cardiac death   Medications - Rosuvastatin - Amlodipine - Pantoprazole - Levofloxacin (completed antibiotic course) - Robitussin for cough - Breo inhaler (200 mcg) - Discontinued: Sodium bicarbonate, calcium supplements, sodium chloride  Review of Systems - Positive for chronic cough - Positive for fainting episode two weeks ago with head injury - Negative for heart-related symptoms at present - Negative for breathing issues at rest or leg swelling  Cardiovascular Summary: ---------------------------------------------- EC2024: NSR 96 bpm, lateral T wave abnormality ---------------------------------------------- Echo: 2024: LVEF 75 %, mild AS, no pericardial effusion, ascending aorta 4.3 cm ---------------------------------------------- Stress: ---------------------------------------------- CT/MRI 2024: Coronary thoracic aortic calcifications. Dilated ascending thoracic aorta measuring up to 4.4 cm. 2020: 4.5 cm aneurysmal dilation of ascending aorta, atheromatous ectasia of aorta and iliac arteries 2020: 4.5 cm aneurysmal dilation of aorta (unchanged) ---------------------------------------------- Remote/ambulatory rhythm monitoring:

## 2024-12-04 NOTE — REASON FOR VISIT
[FreeTextEntry1] : ------------------------------------------------------------------------ SHARLENE OSBORNE is a 69 year old man with HTN, COPD, with NSC lung cancer complicated by IO toxicity (skin, pulmonary) , ascending aortic aneurysm who is seen for syncope.  Prior Cancer Treatments: ------------------------------------------------------------------------ Chemo/targeted therapy: 10/2019: carbo/paclitaxel 2/25/2020-2021: durvalumab (discontinued due to IRAE) 5/2024-8/2024: carbo/paclitaxel ------------------------------------------------------------------------ Surgery: 4/25/2024: endobronchial tumor debulking and bronchial stent placement ------------------------------------------------------------------------ Radiation: 9/2024: right lung 20 Gy [FreeTextEntry3] : Dr. Boles

## 2024-12-04 NOTE — PHYSICAL EXAM
[Well Developed] : well developed [No Acute Distress] : no acute distress [Normal Conjunctiva] : normal conjunctiva [Normal Venous Pressure] : normal venous pressure [No Carotid Bruit] : no carotid bruit [Normal S1, S2] : normal S1, S2 [No Murmur] : no murmur [No Rub] : no rub [No Gallop] : no gallop [No Respiratory Distress] : no respiratory distress  [Normal Gait] : normal gait [No Edema] : no edema [Moves all extremities] : moves all extremities [Normal Speech] : normal speech [Alert and Oriented] : alert and oriented [de-identified] : coughing frequently

## 2024-12-04 NOTE — PHYSICAL EXAM
[Well Developed] : well developed [No Acute Distress] : no acute distress [Normal Conjunctiva] : normal conjunctiva [Normal Venous Pressure] : normal venous pressure [No Carotid Bruit] : no carotid bruit [Normal S1, S2] : normal S1, S2 [No Murmur] : no murmur [No Rub] : no rub [No Gallop] : no gallop [No Respiratory Distress] : no respiratory distress  [Normal Gait] : normal gait [No Edema] : no edema [Moves all extremities] : moves all extremities [Normal Speech] : normal speech [Alert and Oriented] : alert and oriented [de-identified] : coughing frequently

## 2024-12-04 NOTE — PHYSICAL EXAM
What Is The Reason For Today's Visit?: Full Body Skin Examination What Is The Reason For Today's Visit? (Being Monitored For X): concerning skin lesions on an annual basis How Severe Are Your Spot(S)?: mild [Well Developed] : well developed [No Acute Distress] : no acute distress [Normal Conjunctiva] : normal conjunctiva [Normal Venous Pressure] : normal venous pressure [No Carotid Bruit] : no carotid bruit [Normal S1, S2] : normal S1, S2 [No Murmur] : no murmur [No Rub] : no rub [No Gallop] : no gallop [No Respiratory Distress] : no respiratory distress  [Normal Gait] : normal gait [No Edema] : no edema [Moves all extremities] : moves all extremities [Normal Speech] : normal speech [Alert and Oriented] : alert and oriented [de-identified] : coughing frequently

## 2024-12-04 NOTE — HISTORY OF PRESENT ILLNESS
[FreeTextEntry1] : The patient was referred for evaluation of a recent fainting episode. Two weeks ago (in 2024) while at home, he passed out for a few seconds, hit his head, and was taken to [facility]. He feels the episode was related to dehydration or breathing issues. He reports no prodrome and no palpitations. He regained consciousness after a few seconds and was on the floor. He did not sustain serious trauma or injury.  He has no prior history of syncope or heart problems. But his primary care doctor recommended he see a cardiologist for an "enlarged heart." His ECGs have shown LVH with repolarization abnormalities since prior to initiating cancer treatment.  He was diagnosed with non-small cell lung cancer in , treated with chemotherapy and radiation, and has been off chemotherapy since 2024. The patient .previously received immunotherapy with durvalumab but discontinued due to skin (bullous pemphigoid) and lung issues. The patient has a chronic cough, and recently underwent an endobronchial stenting procedure.  Past Medical History: - History of immunotherapy (Durvalumab) with skin and lung reactions. - not currently on systemic immunosuppression - Stage 3 chronic kidney disease - History of high blood pressure - Previous cyst removal from the scalp 30 years ago - dilated aortic root noted on echocardiogram, 4.3 cm  Social History: Lives in [facility] with two sisters. Retired . Former smoker, quit at time of cancer diagnosis ()  Family History: No family history of early/premature CAD He is not aware of any family history of cardiomyopathy or early/sudden cardiac death   Medications - Rosuvastatin - Amlodipine - Pantoprazole - Levofloxacin (completed antibiotic course) - Robitussin for cough - Breo inhaler (200 mcg) - Discontinued: Sodium bicarbonate, calcium supplements, sodium chloride  Review of Systems - Positive for chronic cough - Positive for fainting episode two weeks ago with head injury - Negative for heart-related symptoms at present - Negative for breathing issues at rest or leg swelling  Cardiovascular Summary: ---------------------------------------------- EC2024: NSR 96 bpm, lateral T wave abnormality ---------------------------------------------- Echo: 2024: LVEF 75 %, mild AS, no pericardial effusion, ascending aorta 4.3 cm ---------------------------------------------- Stress: ---------------------------------------------- CT/MRI 2024: Coronary thoracic aortic calcifications. Dilated ascending thoracic aorta measuring up to 4.4 cm. 2020: 4.5 cm aneurysmal dilation of ascending aorta, atheromatous ectasia of aorta and iliac arteries 2020: 4.5 cm aneurysmal dilation of aorta (unchanged) ---------------------------------------------- Remote/ambulatory rhythm monitoring:

## 2024-12-04 NOTE — REVIEW OF SYSTEMS
[Dyspnea on exertion] : dyspnea during exertion [Syncope] : syncope [Cough] : cough [SOB] : no shortness of breath [Lower Ext Edema] : no extremity edema

## 2024-12-04 NOTE — ASSESSMENT
[FreeTextEntry1] : ---------------------------------------------------- 69 year old man with: - metastatic NSCLC s/p chemotherapy/immunotherapy, prior IO toxicity (dermatologic/pulmonary) - Syncope and abnormal ECG (LVH) - ECG with LVH, likely related to hypertension, mild concentric LVH on echo - CKD stage 3 - hypercholesterolemia on rosuvastatin - Hypertension - BP controlled on amlodipine 10 - aortic aneurysm, up to 4.5 cm, but appears stable since 2020   Recommendations - Zio XT monitor placed in office today to assess for cardiac arrhythmia given syncope - Repeat echocardiogram now to evaluate heart size and aortic enlargement - Follow up after above to optimize for ongoing cancer therapy, which may include ICI rechallenge  Prior imaging reports, ECGs and medical records were reviewed in order to formulate the assessment and plan of care. Above recommendations were discussed with the patient and all questions were answered to the best of my ability and to his apparent satisfaction.

## 2024-12-04 NOTE — HISTORY OF PRESENT ILLNESS
[FreeTextEntry1] : The patient was referred for evaluation of a recent fainting episode. Two weeks ago (in 2024) while at home, he passed out for a few seconds, hit his head, and was taken to [facility]. He feels the episode was related to dehydration or breathing issues. He reports no prodrome and no palpitations. He regained consciousness after a few seconds and was on the floor. He did not sustain serious trauma or injury.  He has no prior history of syncope or heart problems. But his primary care doctor recommended he see a cardiologist for an "enlarged heart." His ECGs have shown LVH with repolarization abnormalities since prior to initiating cancer treatment.  He was diagnosed with non-small cell lung cancer in , treated with chemotherapy and radiation, and has been off chemotherapy since 2024. The patient .previously received immunotherapy with durvalumab but discontinued due to skin (bullous pemphigoid) and lung issues. The patient has a chronic cough, and recently underwent an endobronchial stenting procedure.  Past Medical History: - History of immunotherapy (Durvalumab) with skin and lung reactions. - not currently on systemic immunosuppression - Stage 3 chronic kidney disease - History of high blood pressure - Previous cyst removal from the scalp 30 years ago - dilated aortic root noted on echocardiogram, 4.3 cm  Social History: Lives in [facility] with two sisters. Retired . Former smoker, quit at time of cancer diagnosis ()  Family History: No family history of early/premature CAD He is not aware of any family history of cardiomyopathy or early/sudden cardiac death   Medications - Rosuvastatin - Amlodipine - Pantoprazole - Levofloxacin (completed antibiotic course) - Robitussin for cough - Breo inhaler (200 mcg) - Discontinued: Sodium bicarbonate, calcium supplements, sodium chloride  Review of Systems - Positive for chronic cough - Positive for fainting episode two weeks ago with head injury - Negative for heart-related symptoms at present - Negative for breathing issues at rest or leg swelling  Cardiovascular Summary: ---------------------------------------------- EC2024: NSR 96 bpm, lateral T wave abnormality ---------------------------------------------- Echo: 2024: LVEF 75 %, mild AS, no pericardial effusion, ascending aorta 4.3 cm ---------------------------------------------- Stress: ---------------------------------------------- CT/MRI 2024: Coronary thoracic aortic calcifications. Dilated ascending thoracic aorta measuring up to 4.4 cm. 2020: 4.5 cm aneurysmal dilation of ascending aorta, atheromatous ectasia of aorta and iliac arteries 2020: 4.5 cm aneurysmal dilation of aorta (unchanged) ---------------------------------------------- Remote/ambulatory rhythm monitoring:

## 2024-12-05 NOTE — HISTORY OF PRESENT ILLNESS
[FreeTextEntry1] : Mr. Gallo is a 70 yo man referred for evaluation of episode of LOC.  Patient is a 70 yo man with a PMH notable for HTN, anxiety, COPD who was diagnosed with squamous cell lung cancer in 9/2019 after presenting with SOB, wheezing, hemoptysis and weight loss.  CT of the chest had shown a large right hilar mass with mediastinal invasion - encasement of the right pulmonary artery. Thoracic surgery did not recommend surgery, CT CA/P ? liver lesions. He underwent EBUS with ling mass biopsy on 9/10/2019 - Squamous cell ca - PDL1 was 0%.  Brain MRI at diagnoses normal.

## 2024-12-11 NOTE — HISTORY OF PRESENT ILLNESS
[Disease: _____________________] : Disease: [unfilled] [T: ___] : T[unfilled] [N: ___] : N[unfilled] [M: ___] : M[unfilled] [AJCC Stage: ____] : AJCC Stage: [unfilled] [Date: ____________] : Patient's last distress assessment performed on [unfilled]. [8 - Distress Level] : Distress Level: 8 [de-identified] : 69 year old male, with past history significant for Hypertension (untreated), GERD, Anxiety, Depression and Smoking (active), COPD, presented to the ED at [facility] on 9/5/19 with progressive shortness of breath and cough - with wheezing and occasional hemoptysis for the past ~ one month. Reports pleuritic type pains of the right lower chest and back with coughing and repositioning of the body. Patient has had an unquantified weight loss over several months (5 lbs per patient, 10 lbs per nephew). CXR at [facility] showed a lung mass. CTA of chest significant for "5.5 x 4.7 cm sized right hilar mass with mediastinal invasion. Encasement of right pulmonary artery and lobar branches as well as central airways with mucosal plug. Possible endobronchial spread to the right upper lobe." Pt was transferred to [facility] for thoracic surgery eval. Patient was seen by thoracic surgery and deemed not a surgical candidate. CT A/P showed scattered liver lesions. Pulmonology was consulted and EBUS with biopsy of the lung mass was performed on 9/10. Rapid cytology showed malignant cells. Further IHC analysis showed P40 pos and TTF1 negative, confirming sq cell histology. PDL1 was 0%.  Of note, brain MRI was normal.  Since discharge, pt has been feeling tired, and continues to have cough and dyspnea. He is trying to maintaining weight.   Pt is a recent smoker. Smoked 2 ppd of cigarettes for ~ 50 years. Last cigarette was on 9/5- has Nictonine patch now. Pt has not seen a doctor for 25 years prior to the recent admission and has not had any prior CT scans.   10/3/19: Not smoking., Started chemo on Tuesday., Feels better already- still some cough. Gained weight. No AE  10/22/19: Pt feeling well. Continued cough. Small weight loss. States appetite fluctuates. Not smoking. Difficulty sleeping due to anxiety and worry.   11/14/19: Pt returns for follow-up. He has completed 7 weeks of carbo/abraxane and is scheduled for XRT sim tomorrow. He will begin CCRT with same agents. He is feeling well. Gained weight. MIld fatigue. Cough improved. denies pain  1/31/2020: Pt returns for follow up. He completed CCRT on 1/3/2020. States he is feeling well. Denies pain/cough/SOB. Mild residual fatigue from time to time. Reports he QUIT SMOKING!!! It has been 2 months since he had a cigarette. States appetite is good despite no weight gain.   5/5/2020: Pt being seen in treatment room today. He is feeling very well. Offers no complaints. Has been on durvalumab since 2/25/2020 and is tolerating welll. Due to Covid Pandemic regimen has been changed to Q month schedule. He denies cough, SOB, headaches, visual changes. S/P CCRT on 1/3/2020.  6/16/20: No new complaints. HAd CT scans last week, Creatinine elevated on recent blood tests. Pt denies any NSAIDs  7/14/2020: Pt returns for follow up and treatment. He is doing well. Tolerating tx wo any iAE. Pt has been on Durvalumab since 2/25/2020. He will complete one year of therapy. Last imaging in June. No new complaints. ROS: non-contributory.   8/11/20: No complaints. Creatinine still elevated. Saw renal and bx was recommended to r/o possible immune related kidney injury  11/3/2020: Pt doing well. Complains of chronic heartburn. Using rolaids/tums. States has no follow up scheduled with nephrology. Pt states has never had an endoscopy or colonoscopy.   12/1/2020: Pt returns for follow up and discussion of findings on recent imaging. He states he is feeling well but has developed a rash on his RUE that is painful and itchy.   12/29/2020: Pt returns for follow up. Last immunotherapy held due to possible irAE skin toxicity. Started on prednisone 40mg daily. Pt has since developed severe plaque/blister rash. Saw derm had biopsy...results pending. Pt developed profound SOB/ Cough. No fevers.  4/8/21: Pt returns for follow up. Since last visit patient has had multiple admissions to the hospital. In dec he was admitted for severe immune-mediated skin and lung toxicity. He was seen by derm and diagnosed with bullous pemphigoid. He continues to follow with derm and was recently started on Dupixant. He remains on Prednisone 10mg daily. Shortly after discharge he developed severe COVID infection and was hospitalized again 1/21/21-1/26/21. He was evaluated by the [facility] program but has not maintained follow up. She continues to struggle with profound fatigue and significant GARCIAS. He has been off immunotherapy since 12/15/20.   5/13/21: Cutaneous lesions have markedly improved. Continues to follow with Dr. Bradshaw. Gained weight. Feels well. No complaints  9/17/21: Cutaneous lesions continue to improve- right now just some induration and discoloration. Appetite good and gained weight.   5/17/24: Pt was lost to follow up. He was most recently admitted to [facility] on 4/24 with acute hypoxic resp failure- imaging moore demonstrated increased size of R perihilar mass a/w severe narrowing of the R mainstem bronchus. Interventional Pulm was consulted s/p Flex Bronch 4/25 s/p balloon dilation, tumor debunking and BI [id number] Bonastent placement; EBUS guided R hilar mass bx done. bx of right hilar mass consistent with NSCLC, squamous cell carcinoma.  CTa chest 4/25 showed a 6.8 x 4.3 cm R perihilar mass that had increased in size. CT A/P from 5/1 showed no evidence disease in the abdomen or pelvis.  Seen by rad onc inpatient and nothing to be done at that time, outpatient palliative RT. Scheduled to see med onc 5/17/24. He complains of fatigue, weakness and mild intermittent discomfort in the R chest. He also complains of a productive cough, but improved sob and wheeze. He denies hemoptysis and dysphagia, currently tolerating diet well.    6/14/24: Pt is feeling well. he has decreased appetite and has lost some weight. cough and dyspnea improved. Voice has improved/   7/5/24: Patient seen today for follow up while receiving treatment. He reports some fatigue and constipation that is relieved with docusate following chemo. He has also been having cough. Last week he had bronch and stent was cleaned. Appetite is stable, no N/V/D.   7/26/24: Patient seen today for follow up while receiving treatment. He reports overall feeling well. Breathing is stable and he denies N/V/D.   8/15/24: Doing well. Feeling better. Respiratory symptoms have improved.. He has gained weight. Completed 4 cycle of chemo (Carbo/taxol)  9/24/24: Patient seen today for follow up. He has completed 5 frx of RT to the right lung. He reports experiencing fatigue following RT. He continues to have cough despite benzonatate. He experiences SOB when coughing but otherwise breathing is stable. He denies N/V/D, F/C.   10/23/24: Patient seen today for follow up. He reports that he is having a slight intermittent discomfort on the anterior right chest wall that predated RT. He has ongoing cough benzonatate is somewhat helpful. He reports feeling fatigue and tired the past two days but denies rhinorrhea, sore throat, fever, chills, changes in his breathing. He reached out to pulm yesterday and was ordered a CXR which he will have done after our visit today.   11/19/24: Pt here today for follow up. He was recently hospitalized post fall and trauma to head. He underwent some work up. PE study which was negative. CT head with no acute findings. He is home now, and has not had recurrence of the symptoms.   12/11/24: Patient seen today for follow up. Since last visit he has not had any other episodes of syncope. He has seen cardiology, Dr. Guadarrama, and is currently wearing a heart monitor. He had brain MR done that did not show acute pathology. He was supposed to see neuro onc however he cancelled the appointment because he had a cold. His cough is persisting, breathing unchanged. He has seen Dr. Marinelli and is planned for bronch next week. He plans to see his PCP this week for clearance. He states after RT, he felt generally unwell and weak, improving slowly.  [de-identified] : sq cell ca

## 2024-12-11 NOTE — PHYSICAL EXAM
[Restricted in physically strenuous activity but ambulatory and able to carry out work of a light or sedentary nature] : Status 1- Restricted in physically strenuous activity but ambulatory and able to carry out work of a light or sedentary nature, e.g., light house work, office work [Normal] : affect appropriate [de-identified] : Patchy healing rash

## 2024-12-11 NOTE — ASSESSMENT
[Palliative] : Goals of care discussed with patient: Palliative [FreeTextEntry1] : 68 yo m with Stage IIIb sq cell ca, PDL1 0%.  -s/p CCRT -started maintenance therapy with durvalumab 2/25/20.Developed vesicular rash originally thought to be shingles. Was treated with valacyclovir. This then progressed to disseminated plaque and blister rash which was painful. Dx with bullous pemphigoid. Pt was started on Dupixent by derm with improvement in symptoms. Follow up with derm as scheduled. Pt continues on Dupixent every 2 weeks and is off steroids. Subsequently developed COVID in January and was hospitalized 1/21/21-1/26/21.  Immunotherapy permanently discontinued given severe AEs Scans done 4/15/21 as follows Unchanged right hilar mass and nodular right upper lobe mass. Minimal residual ground glass opacities and interlobular septal thickening improved since 01/22/2021. Emphysema.  Indeterminant right renal hypodensity. Suggest further evaluation with MRI. Infrarenal abdominal aortic aneurysm Pt was lost to follow up after that He is here now after being diagnosed with recurrent disease He was admitted to [facility] on 4/24 with acute hypoxic resp failure- imaging moore demonstrated increased size of R perihilar mass a/w severe narrowing of the R mainstem bronchus. Interventional Pulm was consulted s/p Flex Bronch 4/25 s/p balloon dilation, tumor debunking and BI [id number] Bonastent placement; EBUS guided R hilar mass bx done 4/25/24 bx of right hilar mass consistent with NSCLC, squamous cell carcinoma. PET/CT and MR head reviewed- FDG avid hilar/med mass, no other areas of disease He was started on carbo (retreat) AUC 4 plus Taxol 175 mg/m2 in May 2024 Pt has completed 4 cycles of carbo (retreat) AUC 4 plus Taxol 175 mg/m2  CT scans from this month show decreased size of the right hilar mass.  Case presented at  Palliative intent RT was recommended with goal of locoregional disease control Ideally, this should be followed by IO. 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  For now will hold off on IO.  Pt completed RT, 20Gy/5fx which he received from 9/13/2024-9/19/2024. Was gradually improving but had a syncopal event 11/15. No seizures, urinary or fecal incontinence during the episode and was hosptialized. Brain MR did not show acute pathology   Plan: -PET-CT early January  -Cough/dyspnea: Follow up with Dr. Marinelli. Planned for bronch next week. -Syncope: Continue f/u w cardiology to r/o cardiac abnormalities. Encouraged patient to reschedule appointment with neuro onc -Cough; continue benzonatate to 200mg TID. Continue hycodon PRN OV in 4 weeks after PET-CT

## 2024-12-11 NOTE — REVIEW OF SYSTEMS
[Fatigue] : fatigue [SOB on Exertion] : shortness of breath during exertion [Negative] : Allergic/Immunologic [Recent Change In Weight] : ~T no recent weight change [Shortness Of Breath] : no shortness of breath [Wheezing] : no wheezing [Cough] : cough [Skin Rash] : no skin rash [FreeTextEntry2] : occasional  [FreeTextEntry6] : wheeze and cough  [de-identified] : as above

## 2024-12-17 NOTE — HISTORY OF PRESENT ILLNESS
[Scheduled Procedure ___] : a [unfilled] [Date of Surgery ___] : on [unfilled] [Surgeon Name ___] : surgeon: [unfilled] [Stable] : Stable [Cough] : cough [de-identified] : Dr. Hoang [FreeTextEntry1] : The patient was referred for evaluation of an episode of fainting at home in 2024. He feels the episode was related to dehydration or breathing issues. He reports no prodrome and no palpitations. He regained consciousness after a few seconds and was on the floor. He did not sustain serious trauma or injury. He was evaluated at [facility] and dismissed.  He has no prior history of syncope or heart problems. But his primary care doctor recommended he see a cardiologist for an "enlarged heart." His ECGs have shown LVH with repolarization abnormalities since prior to initiating cancer treatment.  He was diagnosed with non-small cell lung cancer in , treated with chemotherapy and radiation, and has been off chemotherapy since 2024. The patient .previously received immunotherapy with durvalumab but discontinued due to skin (bullous pemphigoid) and lung issues. The patient has a chronic cough, and recently underwent an endobronchial stenting procedure. He is scheduled for a follow up bronchoscopy to assess the stent and possible disease progression.   Past Medical History: - History of immunotherapy (Durvalumab) with skin and lung reactions - Stage 3 chronic kidney disease - History of high blood pressure - Previous cyst removal from the scalp 30 years ago - dilated aortic root noted on echocardiogram, 4.3 cm  Social History: Lives in [facility] with two sisters. Retired . Former smoker, quit at time of cancer diagnosis ()  Family History: No family history of early/premature CAD He is not aware of any family history of cardiomyopathy or early/sudden cardiac death   Medications - Rosuvastatin - Amlodipine - Pantoprazole - Robitussin for cough - Breo inhaler (200 mcg)  Cardiovascular Summary: ---------------------------------------------- EC2024: NSR 96 bpm, lateral T wave abnormality ---------------------------------------------- Echo: 2024: LVEF 75 %, mild AS, no pericardial effusion, ascending aorta 4.3 cm ---------------------------------------------- Stress: ---------------------------------------------- CT/MRI 2024: Coronary thoracic aortic calcifications. Dilated ascending thoracic aorta measuring up to 4.4 cm. 2020: 4.5 cm aneurysmal dilation of ascending aorta, atheromatous ectasia of aorta and iliac arteries 2020: 4.5 cm aneurysmal dilation of aorta (unchanged) ---------------------------------------------- Remote/ambulatory rhythm monitorin2024-present: Zio monitoring for syncope thus far without events and demonstrating sinus rhythm

## 2024-12-17 NOTE — DISCUSSION/SUMMARY
[Procedure Intermediate Risk] : the procedure risk is intermediate [Patient Intermediate Risk] : the patient is an intermediate risk [Optimized for Surgery] : the patient is optimized for surgery [As per surgery] : as per surgery [FreeTextEntry1] : There is no absolute cardiac contraindication to the proposed procedure. The patient is at risk for arrhythmia post-procedure. The patient is completing a 14 day monitor for syncope, but daily transmissions thus far show no arrhythmia or other abnormality.

## 2025-01-15 NOTE — REVIEW OF SYSTEMS
[Fatigue] : fatigue [Cough] : cough [SOB on Exertion] : shortness of breath during exertion [Negative] : Allergic/Immunologic [Recent Change In Weight] : ~T no recent weight change [Shortness Of Breath] : no shortness of breath [Wheezing] : no wheezing [Skin Rash] : no skin rash [FreeTextEntry2] : occasional  [FreeTextEntry5] : see above [FreeTextEntry6] : wheeze and cough  [de-identified] : as above

## 2025-01-15 NOTE — ASSESSMENT
[Palliative] : Goals of care discussed with patient: Palliative [FreeTextEntry1] : 68 yo m with Stage IIIb sq cell ca, PDL1 0%.  -s/p CCRT -started maintenance therapy with durvalumab 2/25/20.Developed vesicular rash originally thought to be shingles. Was treated with valacyclovir. This then progressed to disseminated plaque and blister rash which was painful. Dx with bullous pemphigoid. Pt was started on Dupixent by derm with improvement in symptoms. Follow up with derm as scheduled. Pt continues on Dupixent every 2 weeks and is off steroids. Subsequently developed COVID in January and was hospitalized 1/21/21-1/26/21.  Immunotherapy permanently discontinued given severe AEs Scans done 4/15/21 as follows Unchanged right hilar mass and nodular right upper lobe mass. Minimal residual ground glass opacities and interlobular septal thickening improved since 01/22/2021. Emphysema.  Indeterminant right renal hypodensity. Suggest further evaluation with MRI. Infrarenal abdominal aortic aneurysm Pt was lost to follow up after that He is here now after being diagnosed with recurrent disease He was admitted to [facility] on 4/24 with acute hypoxic resp failure- imaging moore demonstrated increased size of R perihilar mass a/w severe narrowing of the R mainstem bronchus. Interventional Pulm was consulted s/p Flex Bronch 4/25 s/p balloon dilation, tumor debunking and BI [id number] Bonastent placement; EBUS guided R hilar mass bx done 4/25/24 bx of right hilar mass consistent with NSCLC, squamous cell carcinoma. PET/CT and MR head reviewed- FDG avid hilar/med mass, no other areas of disease He was started on carbo (retreat) AUC 4 plus Taxol 175 mg/m2 in May 2024 Pt has completed 4 cycles of carbo (retreat) AUC 4 plus Taxol 175 mg/m2  CT scans from this month show decreased size of the right hilar mass.  Case presented at  Palliative intent RT was recommended with goal of locoregional disease control Ideally, this should be followed by IO. 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  For now will hold off on IO.  Pt completed RT, 20Gy/5fx which he received from 9/13/2024-9/19/2024. Was gradually improving but had a syncopal event 11/15. No seizures, urinary or fecal incontinence during the episode and was hosptialized. Brain MR did not show acute pathology   Plan: -His presentation today with substernal chest pain, weakness, nausea, dizziness is concerning for cardiac process. Will send to ER via EMS for further evaluation. Will make Dr. Guadarrama aware.  -He was supposed to have PET-CT done however ate the morning of the test. Will place new order and pt will do after dc from hospital -Cough/dyspnea: Follow up with Dr. Marinelli.   -Syncope: Continue f/u w cardiology to r/o cardiac abnormalities. Encouraged patient to reschedule appointment with neuro onc -Cough; continue benzonatate to 200mg TID. Continue hycodon PRN OV after discharge. Patient informed to call our office to schedule f/u after dc from hospital.

## 2025-01-15 NOTE — HISTORY OF PRESENT ILLNESS
[Disease: _____________________] : Disease: [unfilled] [T: ___] : T[unfilled] [N: ___] : N[unfilled] [M: ___] : M[unfilled] [AJCC Stage: ____] : AJCC Stage: [unfilled] [Date: ____________] : Patient's last distress assessment performed on [unfilled]. [8 - Distress Level] : Distress Level: 8 [de-identified] : 69 year old male, with past history significant for Hypertension (untreated), GERD, Anxiety, Depression and Smoking (active), COPD, presented to the ED at [facility] on 9/5/19 with progressive shortness of breath and cough - with wheezing and occasional hemoptysis for the past ~ one month. Reports pleuritic type pains of the right lower chest and back with coughing and repositioning of the body. Patient has had an unquantified weight loss over several months (5 lbs per patient, 10 lbs per nephew). CXR at [facility] showed a lung mass. CTA of chest significant for "5.5 x 4.7 cm sized right hilar mass with mediastinal invasion. Encasement of right pulmonary artery and lobar branches as well as central airways with mucosal plug. Possible endobronchial spread to the right upper lobe." Pt was transferred to [facility] for thoracic surgery eval. Patient was seen by thoracic surgery and deemed not a surgical candidate. CT A/P showed scattered liver lesions. Pulmonology was consulted and EBUS with biopsy of the lung mass was performed on 9/10. Rapid cytology showed malignant cells. Further IHC analysis showed P40 pos and TTF1 negative, confirming sq cell histology. PDL1 was 0%.  Of note, brain MRI was normal.  Since discharge, pt has been feeling tired, and continues to have cough and dyspnea. He is trying to maintaining weight.   Pt is a recent smoker. Smoked 2 ppd of cigarettes for ~ 50 years. Last cigarette was on 9/5- has Nictonine patch now. Pt has not seen a doctor for 25 years prior to the recent admission and has not had any prior CT scans.   10/3/19: Not smoking., Started chemo on Tuesday., Feels better already- still some cough. Gained weight. No AE  10/22/19: Pt feeling well. Continued cough. Small weight loss. States appetite fluctuates. Not smoking. Difficulty sleeping due to anxiety and worry.   11/14/19: Pt returns for follow-up. He has completed 7 weeks of carbo/abraxane and is scheduled for XRT sim tomorrow. He will begin CCRT with same agents. He is feeling well. Gained weight. MIld fatigue. Cough improved. denies pain  1/31/2020: Pt returns for follow up. He completed CCRT on 1/3/2020. States he is feeling well. Denies pain/cough/SOB. Mild residual fatigue from time to time. Reports he QUIT SMOKING!!! It has been 2 months since he had a cigarette. States appetite is good despite no weight gain.   5/5/2020: Pt being seen in treatment room today. He is feeling very well. Offers no complaints. Has been on durvalumab since 2/25/2020 and is tolerating welll. Due to Covid Pandemic regimen has been changed to Q month schedule. He denies cough, SOB, headaches, visual changes. S/P CCRT on 1/3/2020.  6/16/20: No new complaints. HAd CT scans last week, Creatinine elevated on recent blood tests. Pt denies any NSAIDs  7/14/2020: Pt returns for follow up and treatment. He is doing well. Tolerating tx wo any iAE. Pt has been on Durvalumab since 2/25/2020. He will complete one year of therapy. Last imaging in June. No new complaints. ROS: non-contributory.   8/11/20: No complaints. Creatinine still elevated. Saw renal and bx was recommended to r/o possible immune related kidney injury  11/3/2020: Pt doing well. Complains of chronic heartburn. Using rolaids/tums. States has no follow up scheduled with nephrology. Pt states has never had an endoscopy or colonoscopy.   12/1/2020: Pt returns for follow up and discussion of findings on recent imaging. He states he is feeling well but has developed a rash on his RUE that is painful and itchy.   12/29/2020: Pt returns for follow up. Last immunotherapy held due to possible irAE skin toxicity. Started on prednisone 40mg daily. Pt has since developed severe plaque/blister rash. Saw derm had biopsy...results pending. Pt developed profound SOB/ Cough. No fevers.  4/8/21: Pt returns for follow up. Since last visit patient has had multiple admissions to the hospital. In dec he was admitted for severe immune-mediated skin and lung toxicity. He was seen by derm and diagnosed with bullous pemphigoid. He continues to follow with derm and was recently started on Dupixant. He remains on Prednisone 10mg daily. Shortly after discharge he developed severe COVID infection and was hospitalized again 1/21/21-1/26/21. He was evaluated by the [facility] but has not maintained follow up. She continues to struggle with profound fatigue and significant GARCIAS. He has been off immunotherapy since 12/15/20.   5/13/21: Cutaneous lesions have markedly improved. Continues to follow with Dr. Bradshaw. Gained weight. Feels well. No complaints  9/17/21: Cutaneous lesions continue to improve- right now just some induration and discoloration. Appetite good and gained weight.   5/17/24: Pt was lost to follow up. He was most recently admitted to [facility] on 4/24 with acute hypoxic resp failure- imaging moore demonstrated increased size of R perihilar mass a/w severe narrowing of the R mainstem bronchus. Interventional Pulm was consulted s/p Flex Bronch 4/25 s/p balloon dilation, tumor debunking and BI [id number] Bonastent placement; EBUS guided R hilar mass bx done. bx of right hilar mass consistent with NSCLC, squamous cell carcinoma.  CTa chest 4/25 showed a 6.8 x 4.3 cm R perihilar mass that had increased in size. CT A/P from 5/1 showed no evidence disease in the abdomen or pelvis.  Seen by rad onc inpatient and nothing to be done at that time, outpatient palliative RT. Scheduled to see med onc 5/17/24. He complains of fatigue, weakness and mild intermittent discomfort in the R chest. He also complains of a productive cough, but improved sob and wheeze. He denies hemoptysis and dysphagia, currently tolerating diet well.    6/14/24: Pt is feeling well. he has decreased appetite and has lost some weight. cough and dyspnea improved. Voice has improved/   7/5/24: Patient seen today for follow up while receiving treatment. He reports some fatigue and constipation that is relieved with docusate following chemo. He has also been having cough. Last week he had bronch and stent was cleaned. Appetite is stable, no N/V/D.   7/26/24: Patient seen today for follow up while receiving treatment. He reports overall feeling well. Breathing is stable and he denies N/V/D.   8/15/24: Doing well. Feeling better. Respiratory symptoms have improved.. He has gained weight. Completed 4 cycle of chemo (Carbo/taxol)  9/24/24: Patient seen today for follow up. He has completed 5 frx of RT to the right lung. He reports experiencing fatigue following RT. He continues to have cough despite benzonatate. He experiences SOB when coughing but otherwise breathing is stable. He denies N/V/D, F/C.   10/23/24: Patient seen today for follow up. He reports that he is having a slight intermittent discomfort on the anterior right chest wall that predated RT. He has ongoing cough benzonatate is somewhat helpful. He reports feeling fatigue and tired the past two days but denies rhinorrhea, sore throat, fever, chills, changes in his breathing. He reached out to pulm yesterday and was ordered a CXR which he will have done after our visit today.   11/19/24: Pt here today for follow up. He was recently hospitalized post fall and trauma to head. He underwent some work up. PE study which was negative. CT head with no acute findings. He is home now, and has not had recurrence of the symptoms.   12/11/24: Patient seen today for follow up. Since last visit he has not had any other episodes of syncope. He has seen cardiology, Dr. Guadarrama, and is currently wearing a heart monitor. He had brain MR done that did not show acute pathology. He was supposed to see neuro onc however he cancelled the appointment because he had a cold. His cough is persisting, breathing unchanged. He has seen Dr. Marinelli and is planned for bronch next week. He plans to see his PCP this week for clearance. He states after RT, he felt generally unwell and weak, improving slowly.   1/15/25: Patient seen today for follow up. Upon presentation he reports that he feels weak, sluggish, tired, nauseous, occasional dizziness, and is having substernal chest pain since having endobronchial stent procedure 3-4 weeks ago. VSS in office, HR in the 90s. Of note, he was wearing a heart monitor for evaluation of syncope with Thierry. Per chart note from Dr. Guadarrama "Zio monitor showed 3 second pause and there was sinus bradycardia". There was a plan for outpatient management with EP. However given patients symptoms and presentation today, we will send patient to ER. He feels his breathing is stable, SOB with exertion but not as rest. Continues to have cough and continues on benzonatate.  [de-identified] : sq cell ca

## 2025-01-15 NOTE — PHYSICAL EXAM
[Restricted in physically strenuous activity but ambulatory and able to carry out work of a light or sedentary nature] : Status 1- Restricted in physically strenuous activity but ambulatory and able to carry out work of a light or sedentary nature, e.g., light house work, office work [Normal] : affect appropriate [de-identified] : Patchy healing rash

## 2025-01-22 NOTE — PHYSICAL EXAM
[No Acute Distress] : no acute distress [No Respiratory Distress] : no respiratory distress  [No Focal Deficits] : no focal deficits [Normal Affect] : the affect was normal [Normal Insight/Judgement] : insight and judgment were intact [90009 - High Complexity requires an extensive number of possible diagnoses and/or the management options, extensive complexity of the medical data (tests, etc.) to be reviewed, and a high risk of significant complications, morbidity, and/or mortality as w] : High Complexity

## 2025-01-22 NOTE — HISTORY OF PRESENT ILLNESS
[Rupert] : Post-hospitalization from [facility] [Pneumonia] : Pneumonia [Yes] : Yes [Admitted on: ___] : The patient was admitted on [unfilled] [Discharged on ___] : discharged on [unfilled] [Discharge Summary] : discharge summary [Discharge Med List] : discharge medication list [Patient Contacted By: ____] : and contacted by [unfilled] [Pertinent Labs] : pertinent labs [Radiology Findings] : radiology findings [Med Reconciliation] : medication reconciliation has been completed [FreeTextEntry2] : Patient presents for telehealth for posthospitalization visit, unable to connect, converted to telephonic.   Patient is a 70y/o M, PMHX of stage IV lung cancer (status post bronchial stent placement, not on active treatment), stage 3 chronic kidney disease, hypertension, hyperlipidemia, GERD, and COPD presented to ED with dyspnea on exertion for one month. Found to have acute on chronic hypoxemic respiratory failure. Imaging was suggestive of PNA, admitted for IV antibiotics. With baseline O2 sats on 3L NC at home is 96%; on admission, he saturated at 92% on 3L. Treated with Ceftriaxone, Azithromycin, DuoNeb, and hypertonic saline nebulizer treatments.   Upon presentation, is doing well overall, reports sob on exertion, occasional cough and wheezing, relieved by nebulizer treatments. Denies fever, chills, chest tightness. States he has scheduled all F/U appointments including F/U with Dr. Marinelli and Oncology.

## 2025-01-22 NOTE — PHYSICAL EXAM
[No Acute Distress] : no acute distress [No Respiratory Distress] : no respiratory distress  [No Focal Deficits] : no focal deficits [Normal Affect] : the affect was normal [Normal Insight/Judgement] : insight and judgment were intact [79308 - High Complexity requires an extensive number of possible diagnoses and/or the management options, extensive complexity of the medical data (tests, etc.) to be reviewed, and a high risk of significant complications, morbidity, and/or mortality as w] : High Complexity

## 2025-01-22 NOTE — ASSESSMENT
[FreeTextEntry1] : 68y/o M, PMHX of stage IV lung cancer (status post bronchial stent placement, not on active treatment), stage 3 chronic kidney disease, hypertension, hyperlipidemia, GERD, and COPD presented to ED with dyspnea on exertion for one month. Found to have acute on chronic hypoxemic respiratory failure. Imaging was suggestive of PNA, admitted for IV antibiotics. With baseline O2 sats on 3L NC at home is 96%; on admission, he saturated at 92% on 3L. Treated with Ceftriaxone, Azithromycin, DuoNeb, and hypertonic saline nebulizer treatments.   Upon presentation, is doing well overall, reports sob on exertion, occasional cough and wheezing, relieved by nebulizer treatments. Denies fever, chills, chest tightness. States he has scheduled all F/U appointments including F/U with Dr. Marinelli and Oncology.     -All medications reviewed and reconciled with patient, use as prescribed. -Continue use of oxygen at all times, with monitoring of o2 sats -F/U with Pulmonologist-Dr. Marinelli, Oncology, PCP in 1-2 weeks -Advised to call office immediately with any change or worsening of symptoms -Advised to call 911 of go to ER immediately with any S/S of acute respiratory distress or worsening of symptoms -Reviewed F/U CT scan in 6-8 weeks, resolution of PNA

## 2025-01-22 NOTE — REVIEW OF SYSTEMS
[Fatigue] : fatigue [Wheezing] : wheezing [Cough] : cough [Dyspnea on Exertion] : dyspnea on exertion [Negative] : Heme/Lymph

## 2025-01-22 NOTE — HISTORY OF PRESENT ILLNESS
[[place]] : Post-hospitalization from [facility] [Pneumonia] : Pneumonia [Yes] : Yes [Admitted on: ___] : The patient was admitted on [unfilled] [Discharged on ___] : discharged on [unfilled] [Discharge Summary] : discharge summary [Discharge Med List] : discharge medication list [Patient Contacted By: ____] : and contacted by [unfilled] [Pertinent Labs] : pertinent labs [Radiology Findings] : radiology findings [Med Reconciliation] : medication reconciliation has been completed [FreeTextEntry2] : Patient presents for telehealth for posthospitalization visit, unable to connect, converted to telephonic.   Patient is a 70y/o M, PMHX of stage IV lung cancer (status post bronchial stent placement, not on active treatment), stage 3 chronic kidney disease, hypertension, hyperlipidemia, GERD, and COPD presented to ED with dyspnea on exertion for one month. Found to have acute on chronic hypoxemic respiratory failure. Imaging was suggestive of PNA, admitted for IV antibiotics. With baseline O2 sats on 3L NC at home is 96%; on admission, he saturated at 92% on 3L. Treated with Ceftriaxone, Azithromycin, DuoNeb, and hypertonic saline nebulizer treatments.   Upon presentation, is doing well overall, reports sob on exertion, occasional cough and wheezing, relieved by nebulizer treatments. Denies fever, chills, chest tightness. States he has scheduled all F/U appointments including F/U with Dr. Marinelli and Oncology.

## 2025-01-22 NOTE — ASSESSMENT
[FreeTextEntry1] : 70y/o M, PMHX of stage IV lung cancer (status post bronchial stent placement, not on active treatment), stage 3 chronic kidney disease, hypertension, hyperlipidemia, GERD, and COPD presented to ED with dyspnea on exertion for one month. Found to have acute on chronic hypoxemic respiratory failure. Imaging was suggestive of PNA, admitted for IV antibiotics. With baseline O2 sats on 3L NC at home is 96%; on admission, he saturated at 92% on 3L. Treated with Ceftriaxone, Azithromycin, DuoNeb, and hypertonic saline nebulizer treatments.   Upon presentation, is doing well overall, reports sob on exertion, occasional cough and wheezing, relieved by nebulizer treatments. Denies fever, chills, chest tightness. States he has scheduled all F/U appointments including F/U with Dr. Marinelli and Oncology.     -All medications reviewed and reconciled with patient, use as prescribed. -Continue use of oxygen at all times, with monitoring of o2 sats -F/U with Pulmonologist-Dr. Marinelli, Oncology, PCP in 1-2 weeks -Advised to call office immediately with any change or worsening of symptoms -Advised to call 911 of go to ER immediately with any S/S of acute respiratory distress or worsening of symptoms -Reviewed F/U CT scan in 6-8 weeks, resolution of PNA

## 2025-01-31 NOTE — PHYSICAL EXAM
[No Acute Distress] : no acute distress [Normal Voice/Communication] : normal voice communication [Normal Outer Ear/Nose] : the ears and nose were normal in appearance [No JVD] : no jugular venous distention [No Respiratory Distress] : no respiratory distress [Normal Rate] : heart rate was normal  [Regular Rhythm] : with a regular rhythm [Normal Bowel Sounds] : normal bowel sounds [Non Tender] : non-tender [Normal Gait] : normal gait [No Rash] : no rash [No Skin Lesions] : no skin lesions [Oriented x3] : oriented to person, place, and time [Normal Affect] : the affect was normal [de-identified] : no wheezing, Bilateral air entry [de-identified] : no murmur, no edema

## 2025-01-31 NOTE — HISTORY OF PRESENT ILLNESS
[Patient is stable] : patient is stable [FreeTextEntry2] : 70 yo man, former heavy smoker (100 py), with h/o COPD, Covid-19 infection, HTN, GERD, anxiety/depression, and stage 4 T4, N3, M1  SCC of the lung.   Lung Ca- dx in 2019 with dominant R hilar mass with encasement of R pulm arteriesta rad and chemo then Durvalumab.  4/24 hospitalized with acute hypoxic resp failure- imaging moore demonstrated increased size of R perihilar mass a/w severe narrowing of the R mainstem bronchus s/p balloon dilation, tumor debunking and BI [id number] Bonastent placement;  Will be having another bronch to "clean" the stent Finished RT x5 treatments. COPD- on Breo and duonebs. Has home O2, not needing frequently.  Tylenol #3 was helpful for cough Poor appetite. Lost 5 lb, unintentional.  HTN- on amlodipine 10. Gets dizzy when standing sometimes. I ndependent in ADL's

## 2025-02-24 NOTE — REVIEW OF SYSTEMS
[Fatigue] : fatigue [Cough] : cough [Dyspnea] : dyspnea [Negative] : Psychiatric [Fever] : no fever [Chills] : no chills [TextBox_69] : poor appetite

## 2025-02-24 NOTE — PHYSICAL EXAM
[No Acute Distress] : no acute distress [Normal Oropharynx] : normal oropharynx [Normal Appearance] : normal appearance [No Neck Mass] : no neck mass [Normal Rate/Rhythm] : normal rate/rhythm [Normal S1, S2] : normal s1, s2 [No Resp Distress] : no resp distress [No Abnormalities] : no abnormalities [Benign] : benign [Normal Gait] : normal gait [No Clubbing] : no clubbing [No Cyanosis] : no cyanosis [No Edema] : no edema [FROM] : FROM [Normal Color/ Pigmentation] : normal color/ pigmentation [No Focal Deficits] : no focal deficits [Oriented x3] : oriented x3 [Normal Affect] : normal affect [TextBox_68] : decreased BS on Rt, bronchial breath sounds at Rt apex, clear on left

## 2025-02-24 NOTE — ASSESSMENT
[FreeTextEntry1] : 70y/o M, PMHX of stage IV lung cancer (status post bronchial stent placement, not on active treatment), stage 3 chronic kidney disease, hypertension, hyperlipidemia, GERD, and COPD who presents for follow up.   #Lung CA s/p bronchial stent #Progressive dyspnea and fatigue - concern for stent occlusion or displacement with progressive symptoms due to cough vs other infectious etiology - Also noted significant weight loss, concern for infection or progression of disease - pt in agreement with hospitalization for further management with CT imaging for assess the stent +- bronchoscopy depending on the results as well as evaluation for progressive 10 pound weight loss and loss of appetite.  - transfer to [facility] ED with EMS, inpatient pulmonology team made aware - IP team to follow inpatient  Discussed with family.

## 2025-02-24 NOTE — HISTORY OF PRESENT ILLNESS
[Former] : former [TextBox_4] : Interventional Pulmonology Consultation/Visit Note  70y/o M, PMHX of stage IV lung cancer (status post bronchial stent placement, not on active treatment), stage 3 chronic kidney disease, hypertension, hyperlipidemia, GERD, and COPD who presents for follow up. Last month had hospital admission with hypoxemia, treated for pneumonia with CTX and azithromycin with improvement.  He is presenting today for follow up. He has had progressive fatigue, dyspnea on exertion and cough. He has lost 12 lbs since last visit 1/31/25. He attributes this in part to lack of appetite. Overall feels very ill.

## 2025-03-03 NOTE — REASON FOR VISIT
[FreeTextEntry3] : Dr. Boles [FreeTextEntry1] : =========================================================================== SHARLENE OSBORNE is a 69 year old man with HTN, COPD, with NSC lung cancer complicated by IO toxicity (skin, pulmonary), ascending aortic aneurysm who is seen for follow up of syncope.  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

## 2025-03-03 NOTE — REVIEW OF SYSTEMS
[SOB] : no shortness of breath [Dyspnea on exertion] : dyspnea during exertion [Lower Ext Edema] : no extremity edema [Syncope] : syncope [Cough] : cough

## 2025-03-03 NOTE — PHYSICAL EXAM
[Well Developed] : well developed [No Acute Distress] : no acute distress [Normal Conjunctiva] : normal conjunctiva [Normal Venous Pressure] : normal venous pressure [No Carotid Bruit] : no carotid bruit [Normal S1, S2] : normal S1, S2 [No Murmur] : no murmur [No Rub] : no rub [No Gallop] : no gallop [No Respiratory Distress] : no respiratory distress  [Normal Gait] : normal gait [No Edema] : no edema [Moves all extremities] : moves all extremities [Normal Speech] : normal speech [Alert and Oriented] : alert and oriented [de-identified] : coughing frequently

## 2025-03-03 NOTE — HISTORY OF PRESENT ILLNESS
[FreeTextEntry1] : Interval History: The patient was admitted on 25 and discharged on 25.     History: The patient was referred for evaluation of an episode of fainting at home in 2024. He feels the episode was related to dehydration or breathing issues. He reports no prodrome and no palpitations. He regained consciousness after a few seconds and was on the floor. He did not sustain serious trauma or injury. He was evaluated at [facility] and dismissed.  He has no prior history of syncope or heart problems. But his primary care doctor recommended he see a cardiologist for an "enlarged heart." His ECGs have shown LVH with repolarization abnormalities since prior to initiating cancer treatment.  He was diagnosed with non-small cell lung cancer in , treated with chemotherapy and radiation, and has been off chemotherapy since 2024. The patient previously received immunotherapy with durvalumab but discontinued due to skin (bullous pemphigoid) and lung issues. The patient has a chronic cough, and underwent an endobronchial stent placement.  Past Medical History: - History of immunotherapy (Durvalumab) with skin and lung reactions - Stage 3 chronic kidney disease - History of high blood pressure - Previous cyst removal from the scalp 30 years ago - dilated aortic root noted on echocardiogram, 4.3 cm  Social History: Lives in [facility] [place] [facility] with two sisters. Retired . Former smoker, quit at time of cancer diagnosis ()  Family History: No family history of early/premature CAD He is not aware of any family history of cardiomyopathy or early/sudden cardiac death   Cardiovascular Summary: ---------------------------------------------- ECG: 3/3/2025:  2024: NSR 96 bpm, lateral T wave abnormality ---------------------------------------------- Echo: 2024: LVEF 75 %, mild AS, no pericardial effusion, ascending aorta 4.3 cm ---------------------------------------------- Stress: ---------------------------------------------- CT/MRI 2024: Coronary thoracic aortic calcifications. Dilated ascending thoracic aorta measuring up to 4.4 cm. 2020: 4.5 cm aneurysmal dilation of ascending aorta, atheromatous ectasia of aorta and iliac arteries 2020: 4.5 cm aneurysmal dilation of aorta (unchanged) ---------------------------------------------- Remote/ambulatory rhythm monitorin2024-present: Zio monitoring for syncope, 3 second pause.

## 2025-03-03 NOTE — ASSESSMENT
[FreeTextEntry1] : ================================================================= 69 year old man with: - metastatic NSCLC s/p chemotherapy/immunotherapy, prior IO toxicity (dermatologic/pulmonary) - Syncope and abnormal ECG (LVH) - ECG with LVH, likely related to hypertension, mild concentric LVH on echo - CKD stage 3 - hypercholesterolemia on rosuvastatin - Hypertension - BP controlled on amlodipine 10 - aortic aneurysm, up to 4.5 cm, but appears stable since 2020   Recommendations   Prior imaging reports, ECGs and medical records were reviewed in order to formulate the assessment and plan of care. Above recommendations were discussed with the patient and all questions were answered to the best of my ability and to his apparent satisfaction.

## 2025-03-08 NOTE — ASSESSMENT
[FreeTextEntry1] : 68 YO male, retired , with PMHx of SCC stage 4 lung cancer (dx 2019 and s/p chemoRT (last summer of 2024) and currently in remission) with bronchial stent, COPD on 3L NC at home, HTN, HLD, preDM2 A1C 6.2 (5/2024), GERD, anxiety and depression, and recent admission 1/2025 for post obstructive ESBL PNA. He now represented with progressive fatigue, dyspnea on exertion and productive cough. He has not been compliant with his home airway clearance therapy regimen, stating it makes him feel poor (primarily coughing at night). Sent in for stent evaluation. CT CHEST showed increased in size of RUL necrotic mass encasing the RM. BI stent appears patent, but tumor distal and proximal to stent noted with occlusion of RUL and RML. Patient admitted to RCU and s/p FB, balloon dilation of the RM/ BI, balloon dilation of BI stent, cryotherapy and BAL of BI performed on 2/27. 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 Patient s/p RPT IP Bronch (3/4) for balloon dilation of RMB and replacement with longer bronchial stent resulting in successful reopening of RML.  He states he is breathing well, has no SOB, able to ambulate but limited by fatigue and not by SOB.  Continues to have cough and occasional chest discomfort that comes and goes but is not a concern to him.  No hemoptysis, no fever, no signif c/p.  He is taking his augmentin, Eliquis (10mg bid to start), Breo and using Aerobika.  He has not needed rescue inhaler or duoneb since discharge.  He uses his home O2 for 45 min twice per day, not out of need, but for prevention and maintenance in his opinion; otherwise he is on RA all day and night.  He has not checked his O2 sat.  On reviewing his hospital chart, he was on continuous O2 throughout his stay with ambulating O2 sat 93% on 4LPM on day of discharge.    # Stage 4 NSCLC with progression of pulm dz and occlusion of RUL and RML (CT A/P without evidence of met dz there) # S/p balloon dilitation, cryo, BAL, and replacement of existing stent with longer stent from leonie to RML/RLL junction with resultant opening of RML # Post obstructive PNA s/p meropenem for empiric t. (h/o ESBL PNA), continued on Augmentin until IP appt 3/24 # Chronic hypoxemic respiratory failure - NOT WEARING CONTINUOUS HOME O2 as prescribed.  This appears to be a matter of misunderstanding on the part of Mr Nigel.  He was told in the past that he should use O2 only if he feels the need, and has continued in this way since, despite having required it all through his hospitalization.  I explained that he needs to wear it continuously and goal O2 sat is above 91%.  I asked him to check his O2 sat during our visit but he declined saying his pulse ox is downstairs; i offered to wait while he gets it, but he again declined saying he wants to take a nap.  I urged him to wear his O2 now, and I explained how important the O2 is to the health of all his organs.  He expressed understanding and said he would check later and wear the O2 if his sat is below 92%.   # COPD: continuing Breo, Aerobika, and prn duoneb or albuterol inhaler # new DVTs R subclavian and brachiocephalic veins - on Eliquis # f/u: 3/24  at 1:30 pm IP with Dr Marinelli (had tele-visit yesterday); 3/27 at 3pm with Onc; NP from [facility] on 3/18 at 1pm Reviewed with pt # discharge meds in addition to above, unchanged: amlodipine, pantoprazole, rosuvastatin, Ca, Fe He has office number for questions/concerns/appt and understands to seek immediate medical attention for increased SOB, hemoptysis, chest pain, fever

## 2025-03-08 NOTE — HISTORY OF PRESENT ILLNESS
[Home] : at home, [unfilled] , at the time of the visit. [Telephone (audio)] : This telephonic visit was provided via audio only technology. [No access to tele-video equipment] : patient lacks access to tele-video equipment. [Verbal consent obtained from patient] : the patient, [unfilled] [Discharged with Oxygen] : Discharged with oxygen [Discharged with Nursing Homecare] : Discharged with nursing homecare [[facility]] : Post-hospitalization from [facility] [Lung cancer] : Lung cancer [Pneumonia] : Pneumonia [Other: _____] : [unfilled] [Yes] : Yes [Admitted on: ___] : The patient was admitted on [unfilled] [Discharged on ___] : discharged on [unfilled] [Discharge Summary] : discharge summary [Pertinent Labs] : pertinent labs [Radiology Findings] : radiology findings [Discharge Med List] : discharge medication list [Med Reconciliation] : medication reconciliation has been completed [Patient Contacted By: ____] : and contacted by [unfilled] [FreeTextEntry2] : 70 YO male, retired , with PMHx of SCC stage 4 lung cancer (dx 2019 and s/p chemoRT (last summer of 2024) and currently in remission) with bronchial stent, COPD on 3L NC at home, HTN, HLD, preDM2 A1C 6.2 (5/2024), GERD, anxiety and depression, and recent admission 1/2025 for post obstructive ESBL PNA. He now represented with progressive fatigue, dyspnea on exertion and productive cough. He has not been compliant with his home airway clearance therapy regimen, stating it makes him feel poor (primarily coughing at night). Sent in for stent evaluation. CT CHEST showed increased in size of RUL necrotic mass encasing the RM. BI stent appears patent, but tumor distal and proximal to stent noted with occlusion of RUL and RML. Patient admitted to RCU and s/p FB, balloon dilation of the RM/ BI, balloon dilation of BI stent, cryotherapy and BAL of BI performed on 2/27. 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 Patient s/p RPT IP Bronch (3/4) for balloon dilation of RMB and replacement with longer bronchial stent resulting in successful reopening of RML.  He states he is breathing well, has no SOB, able to ambulate but limited by fatigue and not by SOB.  Continues to have cough and occasional chest discomfort that comes and goes but is not a concern to him.  No hemoptysis, no fever, no signif c/p.  He is taking his augmentin, Eliquis (10mg bid to start), Breo and using Aerobika.  He has not needed rescue inhaler or duoneb since discharge.  He uses his home O2 for 45 min twice per day, not out of need, but for prevention and maintenance in his opinion; otherwise he is on RA all day and night.  He has not checked his O2 sat.  On reviewing his hospital chart, he was on continuous O2 throughout his stay with ambulating O2 sat 93% on 4LPM on day of discharge.

## 2025-03-08 NOTE — PHYSICAL EXAM
[No Acute Distress] : no acute distress [No Respiratory Distress] : no respiratory distress  [10042 - High Complexity requires an extensive number of possible diagnoses and/or the management options, extensive complexity of the medical data (tests, etc.) to be reviewed, and a high risk of significant complications, morbidity, and/or mortality as w] : High Complexity  [de-identified] : no distress but anxious and worried [de-identified] : Speech was not affected or limited by resp issue

## 2025-03-18 NOTE — PHYSICAL EXAM
[No Acute Distress] : no acute distress [Normal Voice/Communication] : normal voice communication [No Respiratory Distress] : no respiratory distress [Oriented x3] : oriented to person, place, and time [Normal Sclera/Conjunctiva] : normal sclera/conjunctiva [Clear to Auscultation] : lungs were clear to auscultation bilaterally [No Accessory Muscle Use] : no accessory muscle use [Normal Rate] : heart rate was normal  [Normal S1, S2] : normal S1 and S2 [Pedal Pulses Present] : the pedal pulses are present [No Edema] : there was no peripheral edema [Normal Bowel Sounds] : normal bowel sounds [Non Tender] : non-tender [Soft] : abdomen soft [Not Distended] : not distended [Normal Gait] : normal gait [No Rash] : no rash

## 2025-03-18 NOTE — HEALTH RISK ASSESSMENT
[Independent] : using telephone [Some assistance needed] : preparing meals [Full assistance needed] : using transportation [No] : The patient does not have visual impairment

## 2025-03-20 NOTE — REASON FOR VISIT
[Post-hospitalization from ___ Hospital] : Post-hospitalization from [unfilled] Hospital [Admitted on: ___] : The patient was admitted on [unfilled] [Discharged on ___] : discharged on [unfilled] [Discharge Summary] : discharge summary [Pertinent Labs] : pertinent labs [Radiology Findings] : radiology findings [Discharge Med List] : discharge medication list [Med Reconciliation] : medication reconciliation has been completed [FreeTextEntry2] : Mr. Manning is a 68 y/o man with SCC stage IV lung cancer with a bronchial stent in place, COPD on 3L, HTN, HLD, Gerd, anxiety and depression, and recent admission 1/2025 for post obstructive ESBL PNA who presented to ER with progressive fatigue, GARCIAS and productive cough. CT chest showed an increase in the size of RUL necrotic mass encasing. Admitted to RCU and s/p FB, balloon dilation of the RM/BI, balloon dilation of BI stent on 2/27/25. The hospital course was complicated by mass encasement of the pulmonary artery, noted with thrombotic occlusion of the R subclavian and brachiocephalic veins. Started on heparin gtt and transitioned to Eliquis. 3/4 s/p bronchoscopy with balloon dilation of RMB and replacement with a longer stent with the successful reopening of RML. Treated also for postobstructive PNA.

## 2025-03-20 NOTE — HISTORY OF PRESENT ILLNESS
[Patient] : patient [FreeTextEntry1] : the maximum effort to leave home due to comorbidities.    [FreeTextEntry2] : Mr. Manning is being seen today for post-hospitalization follow-up. His two sisters were present during the home visit.  -he appears comfortable and in no distress -he is not on oxygen, and reports that he uses it "sometimes throughout the day" -feels sluggish at times -ambulates independently -he manages his medications -reports weight loss, no appetite with food aversions and sometimes nausea -right arm remains swollen from DVT -constipation resolved with senna tablets -last fall was in 11/2024 s/p syncopal episode with treat and release at [facility] -HCP, Alexsander Jules (brother) -advanced directives: CPR, trial of intubation. MOLST present at visit -[facility] have made home visits -Patient and his sisters all have rhinorrhea without any other associated symptoms.

## 2025-03-20 NOTE — HISTORY OF PRESENT ILLNESS
[Patient] : patient [FreeTextEntry1] : the maximum effort to leave home due to comorbidities.    [FreeTextEntry2] : Mr. Manning is being seen today for post-hospitalization follow-up. His two sisters were present during the home visit.  -he appears comfortable and in no distress -he is not on oxygen, and reports that he uses it "sometimes throughout the day" -feels sluggish at times -ambulates independently -he manages his medications -reports weight loss, no appetite with food aversions and sometimes nausea -right arm remains swollen from DVT -constipation resolved with senna tablets -last fall was in 11/2024 [facility] -[facility]CP, Alexsander Jules (brother) -advanced directives: CPR, trial of intubation. MOLST present at visit -[facility] have made home visits -Patient and his sisters all have rhinorrhea without any other associated symptoms.

## 2025-03-20 NOTE — ASSESSMENT
[FreeTextEntry1] : Mr. Manning is a 68 y/o man with SCC stage IV lung cancer with a bronchial stent in place, COPD on 3L, HTN, HLD, Gerd, anxiety and depression, and recent admission 1/2025 for post obstructive ESBL PNA who presented to ER with progressive fatigue, GARCIAS and productive cough. CT chest showed an increase in the size of RUL necrotic mass encasing. Admitted to RCU and s/p FB, balloon dilation of the RM/BI, balloon dilation of BI stent on 2/27/25. The hospital course was complicated by mass encasement of the pulmonary artery, noted with thrombotic occlusion of the R subclavian and brachiocephalic veins. Started on heparin gtt and transitioned to Eliquis. 3/4 s/p bronchoscopy with balloon dilation of RMB and replacement with a longer stent with the successful reopening of RML. Treated also for postobstructive PNA.   -seen today in home for post hospitalization follow up. He remains clinically stable at time of encounter.Discussed importance of using oxygen as prescribed, monitoring oxygen levels, eat high protein foods, small and frequent meals and f/u with specialists. practice meticulous respiratory hygiene.

## 2025-03-20 NOTE — REASON FOR VISIT
[Post-hospitalization from ___ Hospital] : Post-hospitalization from [unfilled] Hospital [Admitted on: ___] : The patient was admitted on [unfilled] [Discharged on ___] : discharged on [unfilled] [Discharge Summary] : discharge summary [Pertinent Labs] : pertinent labs [Radiology Findings] : radiology findings [Discharge Med List] : discharge medication list [Med Reconciliation] : medication reconciliation has been completed [FreeTextEntry2] : Mr. Manning is a 70 y/o man with SCC stage IV lung cancer with a bronchial stent in place, COPD on 3L, HTN, HLD, Gerd, anxiety and depression, and recent admission 1/2025 for post obstructive ESBL PNA who presented to ER with progressive fatigue, GARCIAS and productive cough. CT chest showed an increase in the size of RUL necrotic mass encasing. Admitted to RCU and s/p FB, balloon dilation of the RM/BI, balloon dilation of BI stent on 2/27/25. The hospital course was complicated by mass encasement of the pulmonary artery, noted with thrombotic occlusion of the R subclavian and brachiocephalic veins. Started on heparin gtt and transitioned to Eliquis. 3/4 s/p bronchoscopy with balloon dilation of RMB and replacement with a longer stent with the successful reopening of RML. Treated also for postobstructive PNA.

## 2025-03-20 NOTE — HISTORY OF PRESENT ILLNESS
[Patient] : patient [FreeTextEntry1] : the maximum effort to leave home due to comorbidities.    [FreeTextEntry2] : Mr. Manning is being seen today for post-hospitalization follow-up. His two sisters were present during the home visit.  -he appears comfortable and in no distress -he is not on oxygen, and reports that he uses it "sometimes throughout the day" -feels sluggish at times -ambulates independently -he manages his medications -reports weight loss, no appetite with food aversions and sometimes nausea -right arm remains swollen from DVT -constipation resolved with senna tablets -last fall was in 11/2024 s/p syncopal episode with treat and release at [facility] -HCP, Alexsander Jules (brother) -advanced directives: CPR, trial of intubation. MOLST present at visit -[facility] have made home visits -Patient and his sisters all have rhinorrhea without any other associated symptoms.

## 2025-03-20 NOTE — ASSESSMENT
[FreeTextEntry1] : Mr. Manning is a 70 y/o man with SCC stage IV lung cancer with a bronchial stent in place, COPD on 3L, HTN, HLD, Gerd, anxiety and depression, and recent admission 1/2025 for post obstructive ESBL PNA who presented to ER with progressive fatigue, GARCIAS and productive cough. CT chest showed an increase in the size of RUL necrotic mass encasing. Admitted to RCU and s/p FB, balloon dilation of the RM/BI, balloon dilation of BI stent on 2/27/25. The hospital course was complicated by mass encasement of the pulmonary artery, noted with thrombotic occlusion of the R subclavian and brachiocephalic veins. Started on heparin gtt and transitioned to Eliquis. 3/4 s/p bronchoscopy with balloon dilation of RMB and replacement with a longer stent with the successful reopening of RML. Treated also for postobstructive PNA.   -seen today in home for post hospitalization follow up. He remains clinically stable at time of encounter.Discussed importance of using oxygen as prescribed, monitoring oxygen levels, eat high protein foods, small and frequent meals and f/u with specialists. practice meticulous respiratory hygiene.

## 2025-03-28 NOTE — PHYSICAL EXAM
[Restricted in physically strenuous activity but ambulatory and able to carry out work of a light or sedentary nature] : Status 1- Restricted in physically strenuous activity but ambulatory and able to carry out work of a light or sedentary nature, e.g., light house work, office work [Normal] : affect appropriate [de-identified] : Patchy healing rash

## 2025-03-28 NOTE — REVIEW OF SYSTEMS
[Fatigue] : fatigue [Cough] : cough [SOB on Exertion] : shortness of breath during exertion [Negative] : Allergic/Immunologic [Recent Change In Weight] : ~T no recent weight change [Shortness Of Breath] : no shortness of breath [Wheezing] : no wheezing [Skin Rash] : no skin rash [FreeTextEntry2] : occasional  [FreeTextEntry5] : see above [FreeTextEntry6] : wheeze and cough  [de-identified] : as above

## 2025-03-28 NOTE — PHYSICAL EXAM
[Restricted in physically strenuous activity but ambulatory and able to carry out work of a light or sedentary nature] : Status 1- Restricted in physically strenuous activity but ambulatory and able to carry out work of a light or sedentary nature, e.g., light house work, office work [Normal] : affect appropriate [de-identified] : Patchy healing rash

## 2025-03-28 NOTE — HISTORY OF PRESENT ILLNESS
[Disease: _____________________] : Disease: [unfilled] [T: ___] : T[unfilled] [N: ___] : N[unfilled] [M: ___] : M[unfilled] [AJCC Stage: ____] : AJCC Stage: [unfilled] [Date: ____________] : Patient's last distress assessment performed on [unfilled]. [8 - Distress Level] : Distress Level: 8 [de-identified] : 69 year old male, with past history significant for Hypertension (untreated), GERD, Anxiety, Depression and Smoking (active), COPD, presented to the ED at [place] on 9/5/19 with progressive shortness of breath and cough - with wheezing and occasional hemoptysis for the past ~ one month. Reports pleuritic type pains of the right lower chest and back with coughing and repositioning of the body. Patient has had an unquantified weight loss over several months (5 lbs per patient, 10 lbs per nephew). CXR at [facility] showed a lung mass. CTA of chest significant for "5.5 x 4.7 cm sized right hilar mass with mediastinal invasion. Encasement of right pulmonary artery and lobar branches as well as central airways with mucosal plug. Possible endobronchial spread to the right upper lobe." Pt was transferred to [facility] for thoracic surgery eval. Patient was seen by thoracic surgery and deemed not a surgical candidate. CT A/P showed scattered liver lesions. Pulmonology was consulted and EBUS with biopsy of the lung mass was performed on 9/10. Rapid cytology showed malignant cells. Further IHC analysis showed P40 pos and TTF1 negative, confirming sq cell histology. PDL1 was 0%.  Of note, brain MRI was normal.  Since discharge, pt has been feeling tired, and continues to have cough and dyspnea. He is trying to maintaining weight.   Pt is a recent smoker. Smoked 2 ppd of cigarettes for ~ 50 years. Last cigarette was on 9/5- has Nictonine patch now. Pt has not seen a doctor for 25 years prior to the recent admission and has not had any prior CT scans.   10/3/19: Not smoking., Started chemo on Tuesday., Feels better already- still some cough. Gained weight. No AE  10/22/19: Pt feeling well. Continued cough. Small weight loss. States appetite fluctuates. Not smoking. Difficulty sleeping due to anxiety and worry.   11/14/19: Pt returns for follow-up. He has completed 7 weeks of carbo/abraxane and is scheduled for XRT sim tomorrow. He will begin CCRT with same agents. He is feeling well. Gained weight. MIld fatigue. Cough improved. denies pain  1/31/2020: Pt returns for follow up. He completed CCRT on 1/3/2020. States he is feeling well. Denies pain/cough/SOB. Mild residual fatigue from time to time. Reports he QUIT SMOKING!!! It has been 2 months since he had a cigarette. States appetite is good despite no weight gain.   5/5/2020: Pt being seen in treatment room today. He is feeling very well. Offers no complaints. Has been on durvalumab since 2/25/2020 and is tolerating welll. Due to Covid Pandemic regimen has been changed to Q month schedule. He denies cough, SOB, headaches, visual changes. S/P CCRT on 1/3/2020.  6/16/20: No new complaints. HAd CT scans last week, Creatinine elevated on recent blood tests. Pt denies any NSAIDs  7/14/2020: Pt returns for follow up and treatment. He is doing well. Tolerating tx wo any iAE. Pt has been on Durvalumab since 2/25/2020. He will complete one year of therapy. Last imaging in June. No new complaints. ROS: non-contributory.   8/11/20: No complaints. Creatinine still elevated. Saw renal and bx was recommended to r/o possible immune related kidney injury  11/3/2020: Pt doing well. Complains of chronic heartburn. Using rolaids/tums. States has no follow up scheduled with nephrology. Pt states has never had an endoscopy or colonoscopy.   12/1/2020: Pt returns for follow up and discussion of findings on recent imaging. He states he is feeling well but has developed a rash on his RUE that is painful and itchy.   12/29/2020: Pt returns for follow up. Last immunotherapy held due to possible irAE skin toxicity. Started on prednisone 40mg daily. Pt has since developed severe plaque/blister rash. Saw derm had biopsy...results pending. Pt developed profound SOB/ Cough. No fevers.  4/8/21: Pt returns for follow up. Since last visit patient has had multiple admissions to the hospital. In dec he was admitted for severe immune-mediated skin and lung toxicity. He was seen by derm and diagnosed with bullous pemphigoid. He continues to follow with derm and was recently started on Dupixant. He remains on Prednisone 10mg daily. Shortly after discharge he developed severe COVID infection and was hospitalized again 1/21/21-1/26/21. He was evaluated by the [facility] but has not maintained follow up. She continues to struggle with profound fatigue and significant GARCIAS. He has been off immunotherapy since 12/15/20.   5/13/21: Cutaneous lesions have markedly improved. Continues to follow with Dr. Bradshaw. Gained weight. Feels well. No complaints  9/17/21: Cutaneous lesions continue to improve- right now just some induration and discoloration. Appetite good and gained weight.   5/17/24: Pt was lost to follow up. He was most recently admitted to [facility] on 4/24 with acute hypoxic resp failure- imaging moore demonstrated increased size of R perihilar mass a/w severe narrowing of the R mainstem bronchus. Interventional Pulm was consulted s/p Flex Bronch 4/25 s/p balloon dilation, tumor debunking and BI [id number] Bonastent placement; EBUS guided R hilar mass bx done. bx of right hilar mass consistent with NSCLC, squamous cell carcinoma.  CTa chest 4/25 showed a 6.8 x 4.3 cm R perihilar mass that had increased in size. CT A/P from 5/1 showed no evidence disease in the abdomen or pelvis.  Seen by rad onc inpatient and nothing to be done at that time, outpatient palliative RT. Scheduled to see med onc 5/17/24. He complains of fatigue, weakness and mild intermittent discomfort in the R chest. He also complains of a productive cough, but improved sob and wheeze. He denies hemoptysis and dysphagia, currently tolerating diet well.    6/14/24: Pt is feeling well. he has decreased appetite and has lost some weight. cough and dyspnea improved. Voice has improved/   7/5/24: Patient seen today for follow up while receiving treatment. He reports some fatigue and constipation that is relieved with docusate following chemo. He has also been having cough. Last week he had bronch and stent was cleaned. Appetite is stable, no N/V/D.   7/26/24: Patient seen today for follow up while receiving treatment. He reports overall feeling well. Breathing is stable and he denies N/V/D.   8/15/24: Doing well. Feeling better. Respiratory symptoms have improved.. He has gained weight. Completed 4 cycle of chemo (Carbo/taxol)  9/24/24: Patient seen today for follow up. He has completed 5 frx of RT to the right lung. He reports experiencing fatigue following RT. He continues to have cough despite benzonatate. He experiences SOB when coughing but otherwise breathing is stable. He denies N/V/D, F/C.   10/23/24: Patient seen today for follow up. He reports that he is having a slight intermittent discomfort on the anterior right chest wall that predated RT. He has ongoing cough benzonatate is somewhat helpful. He reports feeling fatigue and tired the past two days but denies rhinorrhea, sore throat, fever, chills, changes in his breathing. He reached out to pulm yesterday and was ordered a CXR which he will have done after our visit today.   11/19/24: Pt here today for follow up. He was recently hospitalized post fall and trauma to head. He underwent some work up. PE study which was negative. CT head with no acute findings. He is home now, and has not had recurrence of the symptoms.   12/11/24: Patient seen today for follow up. Since last visit he has not had any other episodes of syncope. He has seen cardiology, Dr. Guadarrama, and is currently wearing a heart monitor. He had brain MR done that did not show acute pathology. He was supposed to see neuro onc however he cancelled the appointment because he had a cold. His cough is persisting, breathing unchanged. He has seen Dr. Marinelli and is planned for bronch next week. He plans to see his PCP this week for clearance. He states after RT, he felt generally unwell and weak, improving slowly.   1/15/25: Patient seen today for follow up. Upon presentation he reports that he feels weak, sluggish, tired, nauseous, occasional dizziness, and is having substernal chest pain since having endobronchial stent procedure 3-4 weeks ago. VSS in office, HR in the 90s. Of note, he was wearing a heart monitor for evaluation of syncope with Thierry. Per chart note from Dr. Guadarrama "Zio monitor showed 3 second pause and there was sinus bradycardia". There was a plan for outpatient management with EP. However given patients symptoms and presentation today, we will send patient to ER. He feels his breathing is stable, SOB with exertion but not as rest. Continues to have cough and continues on benzonatate.  [de-identified] : sq cell ca

## 2025-03-28 NOTE — REVIEW OF SYSTEMS
[Fatigue] : fatigue [Cough] : cough [SOB on Exertion] : shortness of breath during exertion [Negative] : Allergic/Immunologic [Recent Change In Weight] : ~T no recent weight change [Shortness Of Breath] : no shortness of breath [Wheezing] : no wheezing [Skin Rash] : no skin rash [FreeTextEntry2] : occasional  [FreeTextEntry5] : see above [FreeTextEntry6] : wheeze and cough  [de-identified] : as above

## 2025-03-28 NOTE — PHYSICAL EXAM
[Restricted in physically strenuous activity but ambulatory and able to carry out work of a light or sedentary nature] : Status 1- Restricted in physically strenuous activity but ambulatory and able to carry out work of a light or sedentary nature, e.g., light house work, office work [Normal] : affect appropriate [de-identified] : Patchy healing rash

## 2025-03-28 NOTE — ASSESSMENT
[Palliative] : Goals of care discussed with patient: Palliative [FreeTextEntry1] : 68 yo m with Stage IIIb sq cell ca, PDL1 0%.  -s/p CCRT -started maintenance therapy with durvalumab 2/25/20.Developed vesicular rash originally thought to be shingles. Was treated with valacyclovir. This then progressed to disseminated plaque and blister rash which was painful. Dx with bullous pemphigoid. Pt was started on Dupixent by derm with improvement in symptoms. Follow up with derm as scheduled. Pt continues on Dupixent every 2 weeks and is off steroids. Subsequently developed COVID in January and was hospitalized 1/21/21-1/26/21.  Immunotherapy permanently discontinued given severe AEs Scans done 4/15/21 as follows Unchanged right hilar mass and nodular right upper lobe mass. Minimal residual ground glass opacities and interlobular septal thickening improved since 01/22/2021. Emphysema.  Indeterminant right renal hypodensity. Suggest further evaluation with MRI. Infrarenal abdominal aortic aneurysm Pt was lost to follow up after that He is here now after being diagnosed with recurrent disease He was admitted to [facility] on 4/24 with acute hypoxic resp failure- imaging moore demonstrated increased size of R perihilar mass a/w severe narrowing of the R mainstem bronchus. Interventional Pulm was consulted s/p Flex Bronch 4/25 s/p balloon dilation, tumor debunking and BI [id number] Bonastent placement; EBUS guided R hilar mass bx done 4/25/24 bx of right hilar mass consistent with NSCLC, squamous cell carcinoma. PET/CT and MR head reviewed- FDG avid hilar/med mass, no other areas of disease He was started on carbo (retreat) AUC 4 plus Taxol 175 mg/m2 in May 2024 Pt has completed 4 cycles of carbo (retreat) AUC 4 plus Taxol 175 mg/m2  CT scans from this month show decreased size of the right hilar mass.  Case presented at  Palliative intent RT was recommended with goal of locoregional disease control   Ideally, this should be followed by IO. 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  Pt completed RT, 20Gy/5fx which he received from 9/13/2024-9/19/2024. Was gradually improving but had a syncopal event 11/15. No seizures, urinary or fecal incontinence during the episode and was hosptialized. Brain MR did not show acute pathology     CT Chest 2/24/25: Right bronchial stent appears patent. Occlusion of the bronchi to the middle and upper lobes with collapse of the corresponding lobes.  Right upper lobe mass with internal areas of necrosis, demonstrating  significant enlargement and further encroachment of the mediastinum since 7/18/2024.   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 Tumor obliterates the azygos arch. Collateral venous  return from the right upper extremity is via azygos collaterals to the IVC. Moderate loculated right pleural effusion. Enlarged ascending thoracic aorta measuring 4.8 cm. Partially thrombosed infrarenal abdominal aortic aneurysm measuring up to 4.7 cm.  Plan: -He reports today with chest tightness, weakness. He was in the hospital for Feb 2025, He got another bronchial stent placed while he was in the hospital. He lost about 30lbs in the past few months.  -He was supposed to have PET-CT done however got delayed due to previous cancellation and hospitalization - Will screen for available phase 1 trial that he might be eligible for. - Labs: CBC, CMP, Type and screen and CEA.  -Cough/dyspnea: Follow up with Dr. Marinelli  -Syncope: Continue f/u w cardiology to r/o cardiac abnormalities. Encouraged patient to reschedule appointment with neuro onc - Will refer to derm prior to restarting IO ( he may benefit from restarting dupixent if he's to get IO) -Cough; continue benzonatate to 200mg TID. Continue hycodon PRN OV after discharge. Patient informed to call our office to schedule f/u after dc from hospital.     Paula Ramos  PGY- 6  Heme/onc fellow  I was with the hematology/ oncology fellow, Dr. Ramos for the entire visit and agree with above documentation

## 2025-03-28 NOTE — ASSESSMENT
[Palliative] : Goals of care discussed with patient: Palliative [FreeTextEntry1] : 70 yo m with Stage IIIb sq cell ca, PDL1 0%.  -s/p CCRT -started maintenance therapy with durvalumab 2/25/20.Developed vesicular rash originally thought to be shingles. Was treated with valacyclovir. This then progressed to disseminated plaque and blister rash which was painful. Dx with bullous pemphigoid. Pt was started on Dupixent by derm with improvement in symptoms. Follow up with derm as scheduled. Pt continues on Dupixent every 2 weeks and is off steroids. Subsequently developed COVID in January and was hospitalized 1/21/21-1/26/21.  Immunotherapy permanently discontinued given severe AEs Scans done 4/15/21 as follows Unchanged right hilar mass and nodular right upper lobe mass. Minimal residual ground glass opacities and interlobular septal thickening improved since 01/22/2021. Emphysema.  Indeterminant right renal hypodensity. Suggest further evaluation with MRI. Infrarenal abdominal aortic aneurysm Pt was lost to follow up after that He is here now after being diagnosed with recurrent disease He was admitted to [facility] on 4/24 with acute hypoxic resp failure- imaging moore demonstrated increased size of R perihilar mass a/w severe narrowing of the R mainstem bronchus. Interventional Pulm was consulted s/p Flex Bronch 4/25 s/p balloon dilation, tumor debunking and BI [id number] Bonastent placement; EBUS guided R hilar mass bx done 4/25/24 bx of right hilar mass consistent with NSCLC, squamous cell carcinoma. PET/CT and MR head reviewed- FDG avid hilar/med mass, no other areas of disease He was started on carbo (retreat) AUC 4 plus Taxol 175 mg/m2 in May 2024 Pt has completed 4 cycles of carbo (retreat) AUC 4 plus Taxol 175 mg/m2  CT scans from this month show decreased size of the right hilar mass.  Case presented at  Palliative intent RT was recommended with goal of locoregional disease control   Ideally, this should be followed by IO. 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  Pt completed RT, 20Gy/5fx which he received from 9/13/2024-9/19/2024. Was gradually improving but had a syncopal event 11/15. No seizures, urinary or fecal incontinence during the episode and was hosptialized. Brain MR did not show acute pathology     CT Chest 2/24/25: Right bronchial stent appears patent. Occlusion of the bronchi to the middle and upper lobes with collapse of the corresponding lobes.  Right upper lobe mass with internal areas of necrosis, demonstrating  significant enlargement and further encroachment of the mediastinum since 7/18/2024.   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 Tumor obliterates the azygos arch. Collateral venous  return from the right upper extremity is via azygos collaterals to the IVC. Moderate loculated right pleural effusion. Enlarged ascending thoracic aorta measuring 4.8 cm. Partially thrombosed infrarenal abdominal aortic aneurysm measuring up to 4.7 cm.  Plan: -He reports today with chest tightness, weakness. He was in the hospital for Feb 2025, He got another bronchial stent placed while he was in the hospital. He lost about 30lbs in the past few months.  -He was supposed to have PET-CT done however got delayed due to previous cancellation and hospitalization - Will screen for available phase 1 trial that he might be eligible for. - Labs: CBC, CMP, Type and screen and CEA.  -Cough/dyspnea: Follow up with Dr. Marinelli  -Syncope: Continue f/u w cardiology to r/o cardiac abnormalities. Encouraged patient to reschedule appointment with neuro onc - Will refer to derm prior to restarting IO ( he may benefit from restarting dupixent if he's to get IO) -Cough; continue benzonatate to 200mg TID. Continue hycodon PRN OV after discharge. Patient informed to call our office to schedule f/u after dc from hospital.     Paula Ramos  PGY- 6  Heme/onc fellow  I was with the hematology/ oncology fellow, Dr. Ramos for the entire visit and agree with above documentation

## 2025-03-28 NOTE — ASSESSMENT
[Palliative] : Goals of care discussed with patient: Palliative [FreeTextEntry1] : 68 yo m with Stage IIIb sq cell ca, PDL1 0%.  -s/p CCRT -started maintenance therapy with durvalumab 2/25/20.Developed vesicular rash originally thought to be shingles. Was treated with valacyclovir. This then progressed to disseminated plaque and blister rash which was painful. Dx with bullous pemphigoid. Pt was started on Dupixent by derm with improvement in symptoms. Follow up with derm as scheduled. Pt continues on Dupixent every 2 weeks and is off steroids. Subsequently developed COVID in January and was hospitalized 1/21/21-1/26/21.  Immunotherapy permanently discontinued given severe AEs Scans done 4/15/21 as follows Unchanged right hilar mass and nodular right upper lobe mass. Minimal residual ground glass opacities and interlobular septal thickening improved since 01/22/2021. Emphysema.  Indeterminant right renal hypodensity. Suggest further evaluation with MRI. Infrarenal abdominal aortic aneurysm Pt was lost to follow up after that He is here now after being diagnosed with recurrent disease He was admitted to [facility] on 4/24 with acute hypoxic resp failure- imaging moore demonstrated increased size of R perihilar mass a/w severe narrowing of the R mainstem bronchus. Interventional Pulm was consulted s/p Flex Bronch 4/25 s/p balloon dilation, tumor debunking and BI [id number] Bonastent placement; EBUS guided R hilar mass bx done 4/25/24 bx of right hilar mass consistent with NSCLC, squamous cell carcinoma. PET/CT and MR head reviewed- FDG avid hilar/med mass, no other areas of disease He was started on carbo (retreat) AUC 4 plus Taxol 175 mg/m2 in May 2024 Pt has completed 4 cycles of carbo (retreat) AUC 4 plus Taxol 175 mg/m2  CT scans from this month show decreased size of the right hilar mass.  Case presented at  Palliative intent RT was recommended with goal of locoregional disease control   Ideally, this should be followed by IO. 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  Pt completed RT, 20Gy/5fx which he received from 9/13/2024-9/19/2024. Was gradually improving but had a syncopal event 11/15. No seizures, urinary or fecal incontinence during the episode and was hosptialized. Brain MR did not show acute pathology     CT Chest 2/24/25: Right bronchial stent appears patent. Occlusion of the bronchi to the middle and upper lobes with collapse of the corresponding lobes.  Right upper lobe mass with internal areas of necrosis, demonstrating  significant enlargement and further encroachment of the mediastinum since 7/18/2024.   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 Tumor obliterates the azygos arch. Collateral venous  return from the right upper extremity is via azygos collaterals to the IVC. Moderate loculated right pleural effusion. Enlarged ascending thoracic aorta measuring 4.8 cm. Partially thrombosed infrarenal abdominal aortic aneurysm measuring up to 4.7 cm.  Plan: -He reports today with chest tightness, weakness. He was in the hospital for Feb 2025, He got another bronchial stent placed while he was in the hospital. He lost about 30lbs in the past few months.  -He was supposed to have PET-CT done however got delayed due to previous cancellation and hospitalization - Will screen for available phase 1 trial that he might be eligible for. - Labs: CBC, CMP, Type and screen and CEA.  -Cough/dyspnea: Follow up with Dr. Marinelli  -Syncope: Continue f/u w cardiology to r/o cardiac abnormalities. Encouraged patient to reschedule appointment with neuro onc - Will refer to derm prior to restarting IO ( he may benefit from restarting dupixent if he's to get IO) -Cough; continue benzonatate to 200mg TID. Continue hycodon PRN OV after discharge. Patient informed to call our office to schedule f/u after dc from hospital.     Paula Ramos  PGY- 6  Heme/onc fellow  I was with the hematology/ oncology fellow, Dr. Ramos for the entire visit and agree with above documentation

## 2025-03-28 NOTE — REVIEW OF SYSTEMS
[Fatigue] : fatigue [Cough] : cough [SOB on Exertion] : shortness of breath during exertion [Negative] : Allergic/Immunologic [Recent Change In Weight] : ~T no recent weight change [Shortness Of Breath] : no shortness of breath [Wheezing] : no wheezing [Skin Rash] : no skin rash [FreeTextEntry2] : occasional  [FreeTextEntry5] : see above [FreeTextEntry6] : wheeze and cough  [de-identified] : as above

## 2025-03-28 NOTE — HISTORY OF PRESENT ILLNESS
[Disease: _____________________] : Disease: [unfilled] [T: ___] : T[unfilled] [N: ___] : N[unfilled] [M: ___] : M[unfilled] [AJCC Stage: ____] : AJCC Stage: [unfilled] [Date: ____________] : Patient's last distress assessment performed on [unfilled]. [8 - Distress Level] : Distress Level: 8 [de-identified] : 69 year old male, with past history significant for Hypertension (untreated), GERD, Anxiety, Depression and Smoking (active), COPD, presented to the ED at [facility] on 9/5/19 with progressive shortness of breath and cough - with wheezing and occasional hemoptysis for the past ~ one month. Reports pleuritic type pains of the right lower chest and back with coughing and repositioning of the body. Patient has had an unquantified weight loss over several months (5 lbs per patient, 10 lbs per nephew). CXR at [facility] showed a lung mass. CTA of chest significant for "5.5 x 4.7 cm sized right hilar mass with mediastinal invasion. Encasement of right pulmonary artery and lobar branches as well as central airways with mucosal plug. Possible endobronchial spread to the right upper lobe." Pt was transferred to [facility] for thoracic surgery eval. Patient was seen by thoracic surgery and deemed not a surgical candidate. CT A/P showed scattered liver lesions. Pulmonology was consulted and EBUS with biopsy of the lung mass was performed on 9/10. Rapid cytology showed malignant cells. Further IHC analysis showed P40 pos and TTF1 negative, confirming sq cell histology. PDL1 was 0%.  Of note, brain MRI was normal.  Since discharge, pt has been feeling tired, and continues to have cough and dyspnea. He is trying to maintaining weight.   Pt is a recent smoker. Smoked 2 ppd of cigarettes for ~ 50 years. Last cigarette was on 9/5- has Nictonine patch now. Pt has not seen a doctor for 25 years prior to the recent admission and has not had any prior CT scans.   10/3/19: Not smoking., Started chemo on Tuesday., Feels better already- still some cough. Gained weight. No AE  10/22/19: Pt feeling well. Continued cough. Small weight loss. States appetite fluctuates. Not smoking. Difficulty sleeping due to anxiety and worry.   11/14/19: Pt returns for follow-up. He has completed 7 weeks of carbo/abraxane and is scheduled for XRT sim tomorrow. He will begin CCRT with same agents. He is feeling well. Gained weight. MIld fatigue. Cough improved. denies pain  1/31/2020: Pt returns for follow up. He completed CCRT on 1/3/2020. States he is feeling well. Denies pain/cough/SOB. Mild residual fatigue from time to time. Reports he QUIT SMOKING!!! It has been 2 months since he had a cigarette. States appetite is good despite no weight gain.   5/5/2020: Pt being seen in treatment room today. He is feeling very well. Offers no complaints. Has been on durvalumab since 2/25/2020 and is tolerating welll. Due to Covid Pandemic regimen has been changed to Q month schedule. He denies cough, SOB, headaches, visual changes. S/P CCRT on 1/3/2020.  6/16/20: No new complaints. HAd CT scans last week, Creatinine elevated on recent blood tests. Pt denies any NSAIDs  7/14/2020: Pt returns for follow up and treatment. He is doing well. Tolerating tx wo any iAE. Pt has been on Durvalumab since 2/25/2020. He will complete one year of therapy. Last imaging in June. No new complaints. ROS: non-contributory.   8/11/20: No complaints. Creatinine still elevated. Saw renal and bx was recommended to r/o possible immune related kidney injury  11/3/2020: Pt doing well. Complains of chronic heartburn. Using rolaids/tums. States has no follow up scheduled with nephrology. Pt states has never had an endoscopy or colonoscopy.   12/1/2020: Pt returns for follow up and discussion of findings on recent imaging. He states he is feeling well but has developed a rash on his RUE that is painful and itchy.   12/29/2020: Pt returns for follow up. Last immunotherapy held due to possible irAE skin toxicity. Started on prednisone 40mg daily. Pt has since developed severe plaque/blister rash. Saw derm had biopsy...results pending. Pt developed profound SOB/ Cough. No fevers.  4/8/21: Pt returns for follow up. Since last visit patient has had multiple admissions to the hospital. In dec he was admitted for severe immune-mediated skin and lung toxicity. He was seen by derm and diagnosed with bullous pemphigoid. He continues to follow with derm and was recently started on Dupixant. He remains on Prednisone 10mg daily. Shortly after discharge he developed severe COVID infection and was hospitalized again 1/21/21-1/26/21. He was evaluated by the [facility] program but has not maintained follow up. She continues to struggle with profound fatigue and significant GARCIAS. He has been off immunotherapy since 12/15/20.   5/13/21: Cutaneous lesions have markedly improved. Continues to follow with Dr. Bradshaw. Gained weight. Feels well. No complaints  9/17/21: Cutaneous lesions continue to improve- right now just some induration and discoloration. Appetite good and gained weight.   5/17/24: Pt was lost to follow up. He was most recently admitted to [facility] on 4/24 with acute hypoxic resp failure- imaging moore demonstrated increased size of R perihilar mass a/w severe narrowing of the R mainstem bronchus. Interventional Pulm was consulted s/p Flex Bronch 4/25 s/p balloon dilation, tumor debunking and BI [id number] Bonastent placement; EBUS guided R hilar mass bx done. bx of right hilar mass consistent with NSCLC, squamous cell carcinoma.  CTa chest 4/25 showed a 6.8 x 4.3 cm R perihilar mass that had increased in size. CT A/P from 5/1 showed no evidence disease in the abdomen or pelvis.  Seen by rad onc inpatient and nothing to be done at that time, outpatient palliative RT. Scheduled to see med onc 5/17/24. He complains of fatigue, weakness and mild intermittent discomfort in the R chest. He also complains of a productive cough, but improved sob and wheeze. He denies hemoptysis and dysphagia, currently tolerating diet well.    6/14/24: Pt is feeling well. he has decreased appetite and has lost some weight. cough and dyspnea improved. Voice has improved/   7/5/24: Patient seen today for follow up while receiving treatment. He reports some fatigue and constipation that is relieved with docusate following chemo. He has also been having cough. Last week he had bronch and stent was cleaned. Appetite is stable, no N/V/D.   7/26/24: Patient seen today for follow up while receiving treatment. He reports overall feeling well. Breathing is stable and he denies N/V/D.   8/15/24: Doing well. Feeling better. Respiratory symptoms have improved.. He has gained weight. Completed 4 cycle of chemo (Carbo/taxol)  9/24/24: Patient seen today for follow up. He has completed 5 frx of RT to the right lung. He reports experiencing fatigue following RT. He continues to have cough despite benzonatate. He experiences SOB when coughing but otherwise breathing is stable. He denies N/V/D, F/C.   10/23/24: Patient seen today for follow up. He reports that he is having a slight intermittent discomfort on the anterior right chest wall that predated RT. He has ongoing cough benzonatate is somewhat helpful. He reports feeling fatigue and tired the past two days but denies rhinorrhea, sore throat, fever, chills, changes in his breathing. He reached out to pulm yesterday and was ordered a CXR which he will have done after our visit today.   11/19/24: Pt here today for follow up. He was recently hospitalized post fall and trauma to head. He underwent some work up. PE study which was negative. CT head with no acute findings. He is home now, and has not had recurrence of the symptoms.   12/11/24: Patient seen today for follow up. Since last visit he has not had any other episodes of syncope. He has seen cardiology, Dr. Guadarrama, and is currently wearing a heart monitor. He had brain MR done that did not show acute pathology. He was supposed to see neuro onc however he cancelled the appointment because he had a cold. His cough is persisting, breathing unchanged. He has seen Dr. Marinelli and is planned for bronch next week. He plans to see his PCP this week for clearance. He states after RT, he felt generally unwell and weak, improving slowly.   1/15/25: Patient seen today for follow up. Upon presentation he reports that he feels weak, sluggish, tired, nauseous, occasional dizziness, and is having substernal chest pain since having endobronchial stent procedure 3-4 weeks ago. VSS in office, HR in the 90s. Of note, he was wearing a heart monitor for evaluation of syncope with Thierry. Per chart note from Dr. Guadarrama "Zio monitor showed 3 second pause and there was sinus bradycardia". There was a plan for outpatient management with EP. However given patients symptoms and presentation today, we will send patient to ER. He feels his breathing is stable, SOB with exertion but not as rest. Continues to have cough and continues on benzonatate.  [de-identified] : sq cell ca

## 2025-03-28 NOTE — HISTORY OF PRESENT ILLNESS
[Disease: _____________________] : Disease: [unfilled] [T: ___] : T[unfilled] [N: ___] : N[unfilled] [M: ___] : M[unfilled] [AJCC Stage: ____] : AJCC Stage: [unfilled] [Date: ____________] : Patient's last distress assessment performed on [unfilled]. [8 - Distress Level] : Distress Level: 8 [de-identified] : 69 year old male, with past history significant for Hypertension (untreated), GERD, Anxiety, Depression and Smoking (active), COPD, presented to the ED at [facility] on 9/5/19 with progressive shortness of breath and cough - with wheezing and occasional hemoptysis for the past ~ one month. Reports pleuritic type pains of the right lower chest and back with coughing and repositioning of the body. Patient has had an unquantified weight loss over several months (5 lbs per patient, 10 lbs per nephew). CXR at [facility] showed a lung mass. CTA of chest significant for "5.5 x 4.7 cm sized right hilar mass with mediastinal invasion. Encasement of right pulmonary artery and lobar branches as well as central airways with mucosal plug. Possible endobronchial spread to the right upper lobe." Pt was transferred to [facility] for thoracic surgery eval. Patient was seen by thoracic surgery and deemed not a surgical candidate. CT A/P showed scattered liver lesions. Pulmonology was consulted and EBUS with biopsy of the lung mass was performed on 9/10. Rapid cytology showed malignant cells. Further IHC analysis showed P40 pos and TTF1 negative, confirming sq cell histology. PDL1 was 0%.  Of note, brain MRI was normal.  Since discharge, pt has been feeling tired, and continues to have cough and dyspnea. He is trying to maintaining weight.   Pt is a recent smoker. Smoked 2 ppd of cigarettes for ~ 50 years. Last cigarette was on 9/5- has Nictonine patch now. Pt has not seen a doctor for 25 years prior to the recent admission and has not had any prior CT scans.   10/3/19: Not smoking., Started chemo on Tuesday., Feels better already- still some cough. Gained weight. No AE  10/22/19: Pt feeling well. Continued cough. Small weight loss. States appetite fluctuates. Not smoking. Difficulty sleeping due to anxiety and worry.   11/14/19: Pt returns for follow-up. He has completed 7 weeks of carbo/abraxane and is scheduled for XRT sim tomorrow. He will begin CCRT with same agents. He is feeling well. Gained weight. MIld fatigue. Cough improved. denies pain  1/31/2020: Pt returns for follow up. He completed CCRT on 1/3/2020. States he is feeling well. Denies pain/cough/SOB. Mild residual fatigue from time to time. Reports he QUIT SMOKING!!! It has been 2 months since he had a cigarette. States appetite is good despite no weight gain.   5/5/2020: Pt being seen in treatment room today. He is feeling very well. Offers no complaints. Has been on durvalumab since 2/25/2020 and is tolerating welll. Due to Covid Pandemic regimen has been changed to Q month schedule. He denies cough, SOB, headaches, visual changes. S/P CCRT on 1/3/2020.  6/16/20: No new complaints. HAd CT scans last week, Creatinine elevated on recent blood tests. Pt denies any NSAIDs  7/14/2020: Pt returns for follow up and treatment. He is doing well. Tolerating tx wo any iAE. Pt has been on Durvalumab since 2/25/2020. He will complete one year of therapy. Last imaging in June. No new complaints. ROS: non-contributory.   8/11/20: No complaints. Creatinine still elevated. Saw renal and bx was recommended to r/o possible immune related kidney injury  11/3/2020: Pt doing well. Complains of chronic heartburn. Using rolaids/tums. States has no follow up scheduled with nephrology. Pt states has never had an endoscopy or colonoscopy.   12/1/2020: Pt returns for follow up and discussion of findings on recent imaging. He states he is feeling well but has developed a rash on his RUE that is painful and itchy.   12/29/2020: Pt returns for follow up. Last immunotherapy held due to possible irAE skin toxicity. Started on prednisone 40mg daily. Pt has since developed severe plaque/blister rash. Saw derm had biopsy...results pending. Pt developed profound SOB/ Cough. No fevers.  4/8/21: Pt returns for follow up. Since last visit patient has had multiple admissions to the hospital. In dec he was admitted for severe immune-mediated skin and lung toxicity. He was seen by derm and diagnosed with bullous pemphigoid. He continues to follow with derm and was recently started on Dupixant. He remains on Prednisone 10mg daily. Shortly after discharge he developed severe COVID infection and was hospitalized again 1/21/21-1/26/21. He was evaluated by the [facility] program but has not maintained follow up. She continues to struggle with profound fatigue and significant GARCIAS. He has been off immunotherapy since 12/15/20.   5/13/21: Cutaneous lesions have markedly improved. Continues to follow with Dr. Bradshaw. Gained weight. Feels well. No complaints  9/17/21: Cutaneous lesions continue to improve- right now just some induration and discoloration. Appetite good and gained weight.   5/17/24: Pt was lost to follow up. He was most recently admitted to [facility] on 4/24 with acute hypoxic resp failure- imaging moore demonstrated increased size of R perihilar mass a/w severe narrowing of the R mainstem bronchus. Interventional Pulm was consulted s/p Flex Bronch 4/25 s/p balloon dilation, tumor debunking and BI [id number] Bonastent placement; EBUS guided R hilar mass bx done. bx of right hilar mass consistent with NSCLC, squamous cell carcinoma.  CTa chest 4/25 showed a 6.8 x 4.3 cm R perihilar mass that had increased in size. CT A/P from 5/1 showed no evidence disease in the abdomen or pelvis.  Seen by rad onc inpatient and nothing to be done at that time, outpatient palliative RT. Scheduled to see med onc 5/17/24. He complains of fatigue, weakness and mild intermittent discomfort in the R chest. He also complains of a productive cough, but improved sob and wheeze. He denies hemoptysis and dysphagia, currently tolerating diet well.    6/14/24: Pt is feeling well. he has decreased appetite and has lost some weight. cough and dyspnea improved. Voice has improved/   7/5/24: Patient seen today for follow up while receiving treatment. He reports some fatigue and constipation that is relieved with docusate following chemo. He has also been having cough. Last week he had bronch and stent was cleaned. Appetite is stable, no N/V/D.   7/26/24: Patient seen today for follow up while receiving treatment. He reports overall feeling well. Breathing is stable and he denies N/V/D.   8/15/24: Doing well. Feeling better. Respiratory symptoms have improved.. He has gained weight. Completed 4 cycle of chemo (Carbo/taxol)  9/24/24: Patient seen today for follow up. He has completed 5 frx of RT to the right lung. He reports experiencing fatigue following RT. He continues to have cough despite benzonatate. He experiences SOB when coughing but otherwise breathing is stable. He denies N/V/D, F/C.   10/23/24: Patient seen today for follow up. He reports that he is having a slight intermittent discomfort on the anterior right chest wall that predated RT. He has ongoing cough benzonatate is somewhat helpful. He reports feeling fatigue and tired the past two days but denies rhinorrhea, sore throat, fever, chills, changes in his breathing. He reached out to pulm yesterday and was ordered a CXR which he will have done after our visit today.   11/19/24: Pt here today for follow up. He was recently hospitalized post fall and trauma to head. He underwent some work up. PE study which was negative. CT head with no acute findings. He is home now, and has not had recurrence of the symptoms.   12/11/24: Patient seen today for follow up. Since last visit he has not had any other episodes of syncope. He has seen cardiology, Dr. Guadarrama, and is currently wearing a heart monitor. He had brain MR done that did not show acute pathology. He was supposed to see neuro onc however he cancelled the appointment because he had a cold. His cough is persisting, breathing unchanged. He has seen Dr. Marinelli and is planned for bronch next week. He plans to see his PCP this week for clearance. He states after RT, he felt generally unwell and weak, improving slowly.   1/15/25: Patient seen today for follow up. Upon presentation he reports that he feels weak, sluggish, tired, nauseous, occasional dizziness, and is having substernal chest pain since having endobronchial stent procedure 3-4 weeks ago. VSS in office, HR in the 90s. Of note, he was wearing a heart monitor for evaluation of syncope with Thierry. Per chart note from Dr. Guadarrama "Zio monitor showed 3 second pause and there was sinus bradycardia". There was a plan for outpatient management with EP. However given patients symptoms and presentation today, we will send patient to ER. He feels his breathing is stable, SOB with exertion but not as rest. Continues to have cough and continues on benzonatate.  [de-identified] : sq cell ca

## 2025-03-28 NOTE — ASSESSMENT
[Palliative] : Goals of care discussed with patient: Palliative [FreeTextEntry1] : 70 yo m with Stage IIIb sq cell ca, PDL1 0%.  -s/p CCRT -started maintenance therapy with durvalumab 2/25/20.Developed vesicular rash originally thought to be shingles. Was treated with valacyclovir. This then progressed to disseminated plaque and blister rash which was painful. Dx with bullous pemphigoid. Pt was started on Dupixent by derm with improvement in symptoms. Follow up with derm as scheduled. Pt continues on Dupixent every 2 weeks and is off steroids. Subsequently developed COVID in January and was hospitalized 1/21/21-1/26/21.  Immunotherapy permanently discontinued given severe AEs Scans done 4/15/21 as follows Unchanged right hilar mass and nodular right upper lobe mass. Minimal residual ground glass opacities and interlobular septal thickening improved since 01/22/2021. Emphysema.  Indeterminant right renal hypodensity. Suggest further evaluation with MRI. Infrarenal abdominal aortic aneurysm Pt was lost to follow up after that He is here now after being diagnosed with recurrent disease He was admitted to [facility] on 4/24 with acute hypoxic resp failure- imaging moore demonstrated increased size of R perihilar mass a/w severe narrowing of the R mainstem bronchus. Interventional Pulm was consulted s/p Flex Bronch 4/25 s/p balloon dilation, tumor debunking and BI [id number] Bonastent placement; EBUS guided R hilar mass bx done 4/25/24 bx of right hilar mass consistent with NSCLC, squamous cell carcinoma. PET/CT and MR head reviewed- FDG avid hilar/med mass, no other areas of disease He was started on carbo (retreat) AUC 4 plus Taxol 175 mg/m2 in May 2024 Pt has completed 4 cycles of carbo (retreat) AUC 4 plus Taxol 175 mg/m2  CT scans from this month show decreased size of the right hilar mass.  Case presented at  Palliative intent RT was recommended with goal of locoregional disease control   Ideally, this should be followed by IO. 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  Pt completed RT, 20Gy/5fx which he received from 9/13/2024-9/19/2024. Was gradually improving but had a syncopal event 11/15. No seizures, urinary or fecal incontinence during the episode and was hosptialized. Brain MR did not show acute pathology     CT Chest 2/24/25: Right bronchial stent appears patent. Occlusion of the bronchi to the middle and upper lobes with collapse of the corresponding lobes.  Right upper lobe mass with internal areas of necrosis, demonstrating  significant enlargement and further encroachment of the mediastinum since 7/18/2024.   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 Tumor obliterates the azygos arch. Collateral venous  return from the right upper extremity is via azygos collaterals to the IVC. Moderate loculated right pleural effusion. Enlarged ascending thoracic aorta measuring 4.8 cm. Partially thrombosed infrarenal abdominal aortic aneurysm measuring up to 4.7 cm.  Plan: -He reports today with chest tightness, weakness. He was in the hospital for Feb 2025, He got another bronchial stent placed while he was in the hospital. He lost about 30lbs in the past few months.  -He was supposed to have PET-CT done however got delayed due to previous cancellation and hospitalization - Will screen for available phase 1 trial that he might be eligible for. - Labs: CBC, CMP, Type and screen and CEA.  -Cough/dyspnea: Follow up with Dr. Marinelli  -Syncope: Continue f/u w cardiology to r/o cardiac abnormalities. Encouraged patient to reschedule appointment with neuro onc - Will refer to derm prior to restarting IO ( he may benefit from restarting dupixent if he's to get IO) -Cough; continue benzonatate to 200mg TID. Continue hycodon PRN OV after discharge. Patient informed to call our office to schedule f/u after dc from hospital.     Paula Ramos  PGY- 6  Heme/onc fellow  I was with the hematology/ oncology fellow, Dr. Ramos for the entire visit and agree with above documentation

## 2025-03-28 NOTE — REVIEW OF SYSTEMS
[Fatigue] : fatigue [Cough] : cough [SOB on Exertion] : shortness of breath during exertion [Negative] : Allergic/Immunologic [Recent Change In Weight] : ~T no recent weight change [Shortness Of Breath] : no shortness of breath [Wheezing] : no wheezing [Skin Rash] : no skin rash [FreeTextEntry2] : occasional  [FreeTextEntry5] : see above [FreeTextEntry6] : wheeze and cough  [de-identified] : as above

## 2025-03-28 NOTE — PHYSICAL EXAM
[Restricted in physically strenuous activity but ambulatory and able to carry out work of a light or sedentary nature] : Status 1- Restricted in physically strenuous activity but ambulatory and able to carry out work of a light or sedentary nature, e.g., light house work, office work [Normal] : affect appropriate [de-identified] : Patchy healing rash

## 2025-04-22 NOTE — HISTORY OF PRESENT ILLNESS
[Former] : former [TextBox_4] : Interventional Pulmonology Consultation Note Follow Up Visit with IP:  Apr 14, 2025, 1:00PM   Mr. OSBORNE is a 70-year-old male [id number] pack year smoking hx, as well as COPD, HTN, GERD, anxiety/depression, and stage IIIB (T4N2M0) SCC of the lung dx in 2019 with dominant R hilar mass with encasement of R pulm arteries. He completed definitive ChemoRT with good response (60Gy/30 fx completed in 1/2020) followed by maintenance Durvalumab- however was stopped in 12/2020 due to AEs. Patient was lost to follow up in 2021.  He was most recently admitted to [facility] on 4/24 with acute hypoxic resp failure- imaging moore demonstrated increased size of R perihilar mass a/w severe narrowing of the R mainstem bronchus. Interventional Pulm was consulted s/p Flex Bronch 4/25 s/p balloon dilation, tumor debunking and BI [id number] Bonastent placement; EBUS guided R hilar mass bx done  4/25/24 bx of right hilar mass consistent with NSCLC, squamous cell carcinoma.  CTa chest 4/25 showed a 6.8 x 4.3 cm R perihilar mass that had increased in size.  CT A/P from 5/1 showed no evidence disease in the abdomen or pelvis.  Seen by rad onc inpatient and nothing to be done at that time, outpatient palliative RT. Scheduled to see med onc 5/17/24.  5/6/24 initial radiation consult: Patient here today with nephew. He is feeling well overall but is having some worsening symptoms. He complains of fatigue, weakness and mild intermittent discomfort in the R chest. He also complains of a productive cough, with sob and worsening GARCIAS. He denies hemoptysis and dysphagia, currently tolerating diet well.  Pt was seen by IP 06/26/2024 where he had a Bronchoscopy done for stent evaluation result from that bronchoscopy was notable for: - Fibrin, blood, and scanty fragments of epithelium with squamous metaplasia.  Negative for malignancy.  Since last seen he underwent a PET/CT which demonstrated: - Enlarging precarinal node (2.7 x 2.6 cm, SUV 21.1, previously 1.5 x 1.2 cm, SUV 16.3).  - New small proximal LEFT peribronchial node (1.1 cm, SUV 4.1). New small subcarinal node below resolution of PET (SUV 4.2, image 94). - Right bronchial stent is again noted. Atherosclerosis of coronary arteries and nonaneurysmal thoracic aorta. - Revisualized necrotic RIGHT perihilar mass (6.9 x 6.2, SUV 21.1,previously 6.1 x 4.3 cm, SUV 19.6, 7.5 cm craniocaudal). - There is a small focus of uptake seen in the RIGHT MIDDLE lobe, which may be related to additional malignant involvement versus air bronchogram formation (SUV 4.3). - There are increased air bronchograms seen in the RIGHT lung field. The LEFT lung field is unremarkable. -  Right pleural, and pericardial effusion.  Today he verbalized that he is doing well from a pulmonary standpoint he still has ongoing episodes of cough however they have subsided compared to how he felt from the last visit.  He endorsed that he has been compliant with treatment.

## 2025-04-22 NOTE — REVIEW OF SYSTEMS
[Fatigue] : fatigue [Cough] : cough [SOB on Exertion] : shortness of breath during exertion [Negative] : Allergic/Immunologic [Recent Change In Weight] : ~T no recent weight change [Shortness Of Breath] : no shortness of breath [Wheezing] : no wheezing [Skin Rash] : no skin rash [FreeTextEntry2] : occasional  [FreeTextEntry3] : see above  [FreeTextEntry5] : see above [FreeTextEntry6] : wheeze and cough  [de-identified] : as above

## 2025-04-22 NOTE — ASSESSMENT
[FreeTextEntry1] : 71 y/o M with recurrence of non-small cell lung cancer, presenting to the hospital with profound respiratory failure, requiring noninvasive ventilation. Patient underwent emergent flexible bronchoscopy with stent placement and had significant relief of symptoms post and was discharged. Now feels much better. He had previous received treatment a few years ago but then abruptly stopped without any follow up. Now getting systemic therapy, due to start radiation.   Presents to clinic for follow up. Today we discussed that we will plan for repeat surveillance bronchoscopy in 1weekto both evaluate stent patency, any stent related complications such as mucus plugging, granulation tissue, and possible stent removal. Patient stent education performed. Advised to contact service if any new clinical symptoms. Advised to present to the ER for evaluation if any of the following symptoms noted: New or increased shortness of breath, new or increased chest pain, new or increased cough, new or increased hoarseness of loss of voice. Patient demonstrated understanding with verbal confirmation. Importance of adherence to stent regimen discussed which includes (3% saline nebulization X 3 times a day, Breo Ellipta 200-25 mcg/act inhalation QD, Albuterol nebulization 3 times a day, airway clearance).  The risk and benefits of flex bronchoscopy with stent evaluation including risk of bleeding and risk pneumothorax was discussed with the patient and they demonstrated understanding. In case of pneumothorax, we discussed the need for in hospital monitoring and chest tube placement. In case of bleeding, we explained that they may require inpatient or ICU admission if persistent. Educational reading material regarding the procedure, risks and benefits provided to the patient in paper format. Will also assess effusion during procedure and possible R Thoracentesis if clinically indicated and large pocket of fluid.   Today's medical care services serve as the continuing focal point for needed health care services that are part of ongoing care related to a patient's malignant central airway obstruction, stent management and lung cancer and shortness of breath related to above..

## 2025-04-22 NOTE — ASSESSMENT
[FreeTextEntry1] : 69 y/o M with recurrence of non-small cell lung cancer, presenting to the hospital with profound respiratory failure, requiring noninvasive ventilation. Patient underwent emergent flexible bronchoscopy with stent placement and had significant relief of symptoms post and was discharged. Now feels much better. He had previous received treatment a few years ago but then abruptly stopped without any follow up. Now getting systemic therapy, due to start radiation.   Presents to clinic for follow up. Today we discussed that we will plan for repeat surveillance bronchoscopy in 1weekto both evaluate stent patency, any stent related complications such as mucus plugging, granulation tissue, and possible stent removal. Patient stent education performed. Advised to contact service if any new clinical symptoms. Advised to present to the ER for evaluation if any of the following symptoms noted: New or increased shortness of breath, new or increased chest pain, new or increased cough, new or increased hoarseness of loss of voice. Patient demonstrated understanding with verbal confirmation. Importance of adherence to stent regimen discussed which includes (3% saline nebulization X 3 times a day, Breo Ellipta 200-25 mcg/act inhalation QD, Albuterol nebulization 3 times a day, airway clearance).  The risk and benefits of flex bronchoscopy with stent evaluation including risk of bleeding and risk pneumothorax was discussed with the patient and they demonstrated understanding. In case of pneumothorax, we discussed the need for in hospital monitoring and chest tube placement. In case of bleeding, we explained that they may require inpatient or ICU admission if persistent. Educational reading material regarding the procedure, risks and benefits provided to the patient in paper format. Will also assess effusion during procedure and possible R Thoracentesis if clinically indicated and large pocket of fluid.   Today's medical care services serve as the continuing focal point for needed health care services that are part of ongoing care related to a patient's malignant central airway obstruction, stent management and lung cancer and shortness of breath related to above..

## 2025-04-22 NOTE — HISTORY OF PRESENT ILLNESS
[Former] : former [TextBox_4] : Interventional Pulmonology Consultation Note Follow Up Visit with IP:  Apr 14, 2025, 1:00PM   Mr. OSBORNE is a 70-year-old male [id number] pack year smoking hx, as well as COPD, HTN, GERD, anxiety/depression, and stage IIIB (T4N2M0) SCC of the lung dx in 2019 with dominant R hilar mass with encasement of R pulm arteries. He completed definitive ChemoRT with good response (60Gy/30 fx completed in 1/2020) followed by maintenance Durvalumab- however was stopped in 12/2020 due to AEs. Patient was lost to follow up in 2021.  He was most recently admitted [facility] on 4/24 with acute hypoxic resp failure- imaging moore demonstrated increased size of R perihilar mass a/w severe narrowing of the R mainstem bronchus. Interventional Pulm was consulted s/p Flex Bronch 4/25 s/p balloon dilation, tumor debunking and BI [id number] Bonastent placement; EBUS guided R hilar mass bx done  4/25/24 bx of right hilar mass consistent with NSCLC, squamous cell carcinoma.  CTa chest 4/25 showed a 6.8 x 4.3 cm R perihilar mass that had increased in size.  CT A/P from 5/1 showed no evidence disease in the abdomen or pelvis.  Seen by rad onc inpatient and nothing to be done at that time, outpatient palliative RT. Scheduled to see med onc 5/17/24.  5/6/24 initial radiation consult: Patient here today with nephew. He is feeling well overall but is having some worsening symptoms. He complains of fatigue, weakness and mild intermittent discomfort in the R chest. He also complains of a productive cough, with sob and worsening GARCIAS. He denies hemoptysis and dysphagia, currently tolerating diet well.  Pt was seen by IP 06/26/2024 where he had a Bronchoscopy done for stent evaluation result from that bronchoscopy was notable for: - Fibrin, blood, and scanty fragments of epithelium with squamous metaplasia.  Negative for malignancy.  Since last seen he underwent a PET/CT which demonstrated: - Enlarging precarinal node (2.7 x 2.6 cm, SUV 21.1, previously 1.5 x 1.2 cm, SUV 16.3).  - New small proximal LEFT peribronchial node (1.1 cm, SUV 4.1). New small subcarinal node below resolution of PET (SUV 4.2, image 94). - Right bronchial stent is again noted. Atherosclerosis of coronary arteries and nonaneurysmal thoracic aorta. - Revisualized necrotic RIGHT perihilar mass (6.9 x 6.2, SUV 21.1,previously 6.1 x 4.3 cm, SUV 19.6, 7.5 cm craniocaudal). - There is a small focus of uptake seen in the RIGHT MIDDLE lobe, which may be related to additional malignant involvement versus air bronchogram formation (SUV 4.3). - There are increased air bronchograms seen in the RIGHT lung field. The LEFT lung field is unremarkable. -  Right pleural, and pericardial effusion.  Today he verbalized that he is doing well from a pulmonary standpoint he still has ongoing episodes of cough however they have subsided compared to how he felt from the last visit.  He endorsed that he has been compliant with treatment.

## 2025-04-22 NOTE — HISTORY OF PRESENT ILLNESS
[Former] : former [TextBox_4] : Interventional Pulmonology Consultation Note Follow Up Visit with IP:  Apr 14, 2025, 1:00PM   Mr. OSBORNE is a 70-year-old male [id number] pack year smoking hx, as well as COPD, HTN, GERD, anxiety/depression, and stage IIIB (T4N2M0) SCC of the lung dx in 2019 with dominant R hilar mass with encasement of R pulm arteries. He completed definitive ChemoRT with good response (60Gy/30 fx completed in 1/2020) followed by maintenance Durvalumab- however was stopped in 12/2020 due to AEs. Patient was lost to follow up in 2021.  He was most recently admitted to [facility] on 4/24 with acute hypoxic resp failure- imaging moore demonstrated increased size of R perihilar mass a/w severe narrowing of the R mainstem bronchus. Interventional Pulm was consulted s/p Flex Bronch 4/25 s/p balloon dilation, tumor debunking and BI [id number] Bonastent placement; EBUS guided R hilar mass bx done  4/25/24 bx of right hilar mass consistent with NSCLC, squamous cell carcinoma.  CTa chest 4/25 showed a 6.8 x 4.3 cm R perihilar mass that had increased in size.  CT A/P from 5/1 showed no evidence disease in the abdomen or pelvis.  Seen by rad onc inpatient and nothing to be done at that time, outpatient palliative RT. Scheduled to see med onc 5/17/24.  5/6/24 initial radiation consult: Patient here today with nephew. He is feeling well overall but is having some worsening symptoms. He complains of fatigue, weakness and mild intermittent discomfort in the R chest. He also complains of a productive cough, with sob and worsening GARCIAS. He denies hemoptysis and dysphagia, currently tolerating diet well.  Pt was seen by IP 06/26/2024 where he had a Bronchoscopy done for stent evaluation result from that bronchoscopy was notable for: - Fibrin, blood, and scanty fragments of epithelium with squamous metaplasia.  Negative for malignancy.  Since last seen he underwent a PET/CT which demonstrated: - Enlarging precarinal node (2.7 x 2.6 cm, SUV 21.1, previously 1.5 x 1.2 cm, SUV 16.3).  - New small proximal LEFT peribronchial node (1.1 cm, SUV 4.1). New small subcarinal node below resolution of PET (SUV 4.2, image 94). - Right bronchial stent is again noted. Atherosclerosis of coronary arteries and nonaneurysmal thoracic aorta. - Revisualized necrotic RIGHT perihilar mass (6.9 x 6.2, SUV 21.1,previously 6.1 x 4.3 cm, SUV 19.6, 7.5 cm craniocaudal). - There is a small focus of uptake seen in the RIGHT MIDDLE lobe, which may be related to additional malignant involvement versus air bronchogram formation (SUV 4.3). - There are increased air bronchograms seen in the RIGHT lung field. The LEFT lung field is unremarkable. -  Right pleural, and pericardial effusion.  Today he verbalized that he is doing well from a pulmonary standpoint he still has ongoing episodes of cough however they have subsided compared to how he felt from the last visit.  He endorsed that he has been compliant with treatment.

## 2025-04-22 NOTE — HISTORY OF PRESENT ILLNESS
[Former] : former [TextBox_4] : Interventional Pulmonology Consultation Note Follow Up Visit with IP:  Apr 14, 2025, 1:00PM   Mr. OSBORNE is a 70-year-old male [id number] pack year smoking hx, as well as COPD, HTN, GERD, anxiety/depression, and stage IIIB (T4N2M0) SCC of the lung dx in 2019 with dominant R hilar mass with encasement of R pulm arteries. He completed definitive ChemoRT with good response (60Gy/30 fx completed in 1/2020) followed by maintenance Durvalumab- however was stopped in 12/2020 due to AEs. Patient was lost to follow up in 2021.  He was most recently admitted to [facility] on 4/24 with acute hypoxic resp failure- imaging moore demonstrated increased size of R perihilar mass a/w severe narrowing of the R mainstem bronchus. Interventional Pulm was consulted s/p Flex Bronch 4/25 s/p balloon dilation, tumor debunking and BI [id number] Bonastent placement; EBUS guided R hilar mass bx done  4/25/24 bx of right hilar mass consistent with NSCLC, squamous cell carcinoma.  CTa chest 4/25 showed a 6.8 x 4.3 cm R perihilar mass that had increased in size.  CT A/P from 5/1 showed no evidence disease in the abdomen or pelvis.  Seen by rad onc inpatient and nothing to be done at that time, outpatient palliative RT. Scheduled to see med onc 5/17/24.  5/6/24 initial radiation consult: Patient here today with nephew. He is feeling well overall but is having some worsening symptoms. He complains of fatigue, weakness and mild intermittent discomfort in the R chest. He also complains of a productive cough, with sob and worsening GARCIAS. He denies hemoptysis and dysphagia, currently tolerating diet well.  Pt was seen by IP 06/26/2024 where he had a Bronchoscopy done for stent evaluation result from that bronchoscopy was notable for: - Fibrin, blood, and scanty fragments of epithelium with squamous metaplasia.  Negative for malignancy.  Since last seen he underwent a PET/CT which demonstrated: - Enlarging precarinal node (2.7 x 2.6 cm, SUV 21.1, previously 1.5 x 1.2 cm, SUV 16.3).  - New small proximal LEFT peribronchial node (1.1 cm, SUV 4.1). New small subcarinal node below resolution of PET (SUV 4.2, image 94). - Right bronchial stent is again noted. Atherosclerosis of coronary arteries and nonaneurysmal thoracic aorta. - Revisualized necrotic RIGHT perihilar mass (6.9 x 6.2, SUV 21.1,previously 6.1 x 4.3 cm, SUV 19.6, 7.5 cm craniocaudal). - There is a small focus of uptake seen in the RIGHT MIDDLE lobe, which may be related to additional malignant involvement versus air bronchogram formation (SUV 4.3). - There are increased air bronchograms seen in the RIGHT lung field. The LEFT lung field is unremarkable. -  Right pleural, and pericardial effusion.  Today he verbalized that he is doing well from a pulmonary standpoint he still has ongoing episodes of cough however they have subsided compared to how he felt from the last visit.  He endorsed that he has been compliant with treatment.

## 2025-04-22 NOTE — HISTORY OF PRESENT ILLNESS
[Former] : former [TextBox_4] : Interventional Pulmonology Consultation Note Follow Up Visit with IP:  Apr 14, 2025, 1:00PM   Mr. OSBORNE is a 70-year-old male [id number] pack year smoking hx, as well as COPD, HTN, GERD, anxiety/depression, and stage IIIB (T4N2M0) SCC of the lung dx in 2019 with dominant R hilar mass with encasement of R pulm arteries. He completed definitive ChemoRT with good response (60Gy/30 fx completed in 1/2020) followed by maintenance Durvalumab- however was stopped in 12/2020 due to AEs. Patient was lost to follow up in 2021.  He was most recently admitted to [facility] on 4/24 with acute hypoxic resp failure- imaging moore demonstrated increased size of R perihilar mass a/w severe narrowing of the R mainstem bronchus. Interventional Pulm was consulted s/p Flex Bronch 4/25 s/p balloon dilation, tumor debunking and BI [id number] Bonastent placement; EBUS guided R hilar mass bx done  4/25/24 bx of right hilar mass consistent with NSCLC, squamous cell carcinoma.  CTa chest 4/25 showed a 6.8 x 4.3 cm R perihilar mass that had increased in size.  CT A/P from 5/1 showed no evidence disease in the abdomen or pelvis.  Seen by rad onc inpatient and nothing to be done at that time, outpatient palliative RT. Scheduled to see med onc 5/17/24.  5/6/24 initial radiation consult: Patient here today with nephew. He is feeling well overall but is having some worsening symptoms. He complains of fatigue, weakness and mild intermittent discomfort in the R chest. He also complains of a productive cough, with sob and worsening GARCIAS. He denies hemoptysis and dysphagia, currently tolerating diet well.  Pt was seen by IP 06/26/2024 where he had a Bronchoscopy done for stent evaluation result from that bronchoscopy was notable for: - Fibrin, blood, and scanty fragments of epithelium with squamous metaplasia.  Negative for malignancy.  Since last seen he underwent a PET/CT which demonstrated: - Enlarging precarinal node (2.7 x 2.6 cm, SUV 21.1, previously 1.5 x 1.2 cm, SUV 16.3).  - New small proximal LEFT peribronchial node (1.1 cm, SUV 4.1). New small subcarinal node below resolution of PET (SUV 4.2, image 94). - Right bronchial stent is again noted. Atherosclerosis of coronary arteries and nonaneurysmal thoracic aorta. - Revisualized necrotic RIGHT perihilar mass (6.9 x 6.2, SUV 21.1,previously 6.1 x 4.3 cm, SUV 19.6, 7.5 cm craniocaudal). - There is a small focus of uptake seen in the RIGHT MIDDLE lobe, which may be related to additional malignant involvement versus air bronchogram formation (SUV 4.3). - There are increased air bronchograms seen in the RIGHT lung field. The LEFT lung field is unremarkable. -  Right pleural, and pericardial effusion.  Today he verbalized that he is doing well from a pulmonary standpoint he still has ongoing episodes of cough however they have subsided compared to how he felt from the last visit.  He endorsed that he has been compliant with treatment.

## 2025-04-22 NOTE — ASSESSMENT
[Palliative] : Goals of care discussed with patient: Palliative [FreeTextEntry1] : 70 yo m with Stage IIIb sq cell ca, PDL1 0%.  -s/p CCRT -started maintenance therapy with durvalumab 2/25/20.Developed vesicular rash originally thought to be shingles. Was treated with valacyclovir. This then progressed to disseminated plaque and blister rash which was painful. Dx with bullous pemphigoid. Pt was started on Dupixent by derm with improvement in symptoms. Follow up with derm as scheduled. Pt continues on Dupixent every 2 weeks and is off steroids. Subsequently developed COVID in January and was hospitalized 1/21/21-1/26/21.  Immunotherapy permanently discontinued given severe AEs Scans done 4/15/21 as follows Unchanged right hilar mass and nodular right upper lobe mass. Minimal residual ground glass opacities and interlobular septal thickening improved since 01/22/2021. Emphysema.  Indeterminant right renal hypodensity. Suggest further evaluation with MRI. Infrarenal abdominal aortic aneurysm Pt was lost to follow up after that He is here now after being diagnosed with recurrent disease He was admitted to [facility] on 4/24 with acute hypoxic resp failure- imaging moore demonstrated increased size of R perihilar mass a/w severe narrowing of the R mainstem bronchus. Interventional Pulm was consulted s/p Flex Bronch 4/25 s/p balloon dilation, tumor debunking and BI [id number] Bonastent placement; EBUS guided R hilar mass bx done 4/25/24 bx of right hilar mass consistent with NSCLC, squamous cell carcinoma. PET/CT and MR head reviewed- FDG avid hilar/med mass, no other areas of disease He was started on carbo (retreat) AUC 4 plus Taxol 175 mg/m2 in May 2024 Pt has completed 4 cycles of carbo (retreat) AUC 4 plus Taxol 175 mg/m2  CT scans from this month show decreased size of the right hilar mass.  Case presented at  Palliative intent RT was recommended with goal of locoregional disease control   Ideally, this should be followed by IO. 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  Pt completed RT, 20Gy/5fx which he received from 9/13/2024-9/19/2024. Was gradually improving but had a syncopal event 11/15. No seizures, urinary or fecal incontinence during the episode and was hosptialized. Brain MR did not show acute pathology    He was in the hospital for Feb 2025, He got another bronchial stent placed while he was in the hospital. CT Chest 2/24/25: Right bronchial stent appears patent. Occlusion of the bronchi to the middle and upper lobes with collapse of the corresponding lobes.  Right upper lobe mass with internal areas of necrosis, demonstrating  significant enlargement and further encroachment of the mediastinum since 7/18/2024.   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 Tumor obliterates the azygos arch. Collateral venous  return from the right upper extremity is via azygos collaterals to the IVC. Moderate loculated right pleural effusion. Enlarged ascending thoracic aorta measuring 4.8 cm. Partially thrombosed infrarenal abdominal aortic aneurysm measuring up to 4.7 cm. He was started on anticoagulation for DVT.  PET-CT from April 2025 shows  Enlarging necrotic RIGHT lung mass. Enlarging metastatic lymphadenopathy in the chest. Small focus of uptake within the RIGHT lung suspicious for additional pathology. Proliferated RIGHT lung air bronchograms and RIGHT pleural effusion. Pericardial effusion.  Plan: -Today he presents with s/s c/f SVC syndrome. As such, will send to [facility] ED for urgent evaluation and management.  - Given POD, will tentatively schedule keytruda to start May 2nd. Dr. Boles discussed with Dr. Bradshaw and OK to start IO. Patient will need appointment with Dr. Bradshaw in parallel with keytruda  -Cough/dyspnea: Follow up with Dr. Marinelli  -Syncope: Continue f/u w cardiology to r/o cardiac abnormalities. Encouraged patient to reschedule appointment with neuro onc -Cough; continue benzonatate to 200mg TID. Continue hycodon PRN  OV once discharged or with first keytruda.

## 2025-04-22 NOTE — HISTORY OF PRESENT ILLNESS
[Disease: _____________________] : Disease: [unfilled] [T: ___] : T[unfilled] [N: ___] : N[unfilled] [M: ___] : M[unfilled] [AJCC Stage: ____] : AJCC Stage: [unfilled] [Date: ____________] : Patient's last distress assessment performed on [unfilled]. [8 - Distress Level] : Distress Level: 8 [de-identified] : 69 year old male, with past history significant for Hypertension (untreated), GERD, Anxiety, Depression and Smoking (active), COPD, presented to the ED at [place] on 9/5/19 with progressive shortness of breath and cough - with wheezing and occasional hemoptysis for the past ~ one month. Reports pleuritic type pains of the right lower chest and back with coughing and repositioning of the body. Patient has had an unquantified weight loss over several months (5 lbs per patient, 10 lbs per nephew). CXR at [place] showed a lung mass. CTA of chest significant for "5.5 x 4.7 cm sized right hilar mass with mediastinal invasion. Encasement of right pulmonary artery and lobar branches as well as central airways with mucosal plug. Possible endobronchial spread to the right upper lobe." Pt was transferred to [facility] for thoracic surgery eval. Patient was seen by thoracic surgery and deemed not a surgical candidate. CT A/P showed scattered liver lesions. Pulmonology was consulted and EBUS with biopsy of the lung mass was performed on 9/10. Rapid cytology showed malignant cells. Further IHC analysis showed P40 pos and TTF1 negative, confirming sq cell histology. PDL1 was 0%.  Of note, brain MRI was normal.  Since discharge, pt has been feeling tired, and continues to have cough and dyspnea. He is trying to maintaining weight.   Pt is a recent smoker. Smoked 2 ppd of cigarettes for ~ 50 years. Last cigarette was on 9/5- has Nictonine patch now. Pt has not seen a doctor for 25 years prior to the recent admission and has not had any prior CT scans.   10/3/19: Not smoking., Started chemo on Tuesday., Feels better already- still some cough. Gained weight. No AE  10/22/19: Pt feeling well. Continued cough. Small weight loss. States appetite fluctuates. Not smoking. Difficulty sleeping due to anxiety and worry.   11/14/19: Pt returns for follow-up. He has completed 7 weeks of carbo/abraxane and is scheduled for XRT sim tomorrow. He will begin CCRT with same agents. He is feeling well. Gained weight. MIld fatigue. Cough improved. denies pain  1/31/2020: Pt returns for follow up. He completed CCRT on 1/3/2020. States he is feeling well. Denies pain/cough/SOB. Mild residual fatigue from time to time. Reports he QUIT SMOKING!!! It has been 2 months since he had a cigarette. States appetite is good despite no weight gain.   5/5/2020: Pt being seen in treatment room today. He is feeling very well. Offers no complaints. Has been on durvalumab since 2/25/2020 and is tolerating welll. Due to Covid Pandemic regimen has been changed to Q month schedule. He denies cough, SOB, headaches, visual changes. S/P CCRT on 1/3/2020.  6/16/20: No new complaints. HAd CT scans last week, Creatinine elevated on recent blood tests. Pt denies any NSAIDs  7/14/2020: Pt returns for follow up and treatment. He is doing well. Tolerating tx wo any iAE. Pt has been on Durvalumab since 2/25/2020. He will complete one year of therapy. Last imaging in June. No new complaints. ROS: non-contributory.   8/11/20: No complaints. Creatinine still elevated. Saw renal and bx was recommended to r/o possible immune related kidney injury  11/3/2020: Pt doing well. Complains of chronic heartburn. Using rolaids/tums. States has no follow up scheduled with nephrology. Pt states has never had an endoscopy or colonoscopy.   12/1/2020: Pt returns for follow up and discussion of findings on recent imaging. He states he is feeling well but has developed a rash on his RUE that is painful and itchy.   12/29/2020: Pt returns for follow up. Last immunotherapy held due to possible irAE skin toxicity. Started on prednisone 40mg daily. Pt has since developed severe plaque/blister rash. Saw derm had biopsy...results pending. Pt developed profound SOB/ Cough. No fevers.  4/8/21: Pt returns for follow up. Since last visit patient has had multiple admissions to the hospital. In dec he was admitted for severe immune-mediated skin and lung toxicity. He was seen by derm and diagnosed with bullous pemphigoid. He continues to follow with derm and was recently started on Dupixant. He remains on Prednisone 10mg daily. Shortly after discharge he developed severe COVID infection and was hospitalized again 1/21/21-1/26/21. He was evaluated by the [facility] but has not maintained follow up. She continues to struggle with profound fatigue and significant GARCIAS. He has been off immunotherapy since 12/15/20.   5/13/21: Cutaneous lesions have markedly improved. Continues to follow with Dr. Bradshaw. Gained weight. Feels well. No complaints  9/17/21: Cutaneous lesions continue to improve- right now just some induration and discoloration. Appetite good and gained weight.   5/17/24: Pt was lost to follow up. He was most recently admitted to [facility] on 4/24 with acute hypoxic resp failure- imaging moore demonstrated increased size of R perihilar mass a/w severe narrowing of the R mainstem bronchus. Interventional Pulm was consulted s/p Flex Bronch 4/25 s/p balloon dilation, tumor debunking and BI [id number] Bonastent placement; EBUS guided R hilar mass bx done. bx of right hilar mass consistent with NSCLC, squamous cell carcinoma.  CTa chest 4/25 showed a 6.8 x 4.3 cm R perihilar mass that had increased in size. CT A/P from 5/1 showed no evidence disease in the abdomen or pelvis.  Seen by rad onc inpatient and nothing to be done at that time, outpatient palliative RT. Scheduled to see med onc 5/17/24. He complains of fatigue, weakness and mild intermittent discomfort in the R chest. He also complains of a productive cough, but improved sob and wheeze. He denies hemoptysis and dysphagia, currently tolerating diet well.    6/14/24: Pt is feeling well. he has decreased appetite and has lost some weight. cough and dyspnea improved. Voice has improved/   7/5/24: Patient seen today for follow up while receiving treatment. He reports some fatigue and constipation that is relieved with docusate following chemo. He has also been having cough. Last week he had bronch and stent was cleaned. Appetite is stable, no N/V/D.   7/26/24: Patient seen today for follow up while receiving treatment. He reports overall feeling well. Breathing is stable and he denies N/V/D.   8/15/24: Doing well. Feeling better. Respiratory symptoms have improved.. He has gained weight. Completed 4 cycle of chemo (Carbo/taxol)  9/24/24: Patient seen today for follow up. He has completed 5 frx of RT to the right lung. He reports experiencing fatigue following RT. He continues to have cough despite benzonatate. He experiences SOB when coughing but otherwise breathing is stable. He denies N/V/D, F/C.   10/23/24: Patient seen today for follow up. He reports that he is having a slight intermittent discomfort on the anterior right chest wall that predated RT. He has ongoing cough benzonatate is somewhat helpful. He reports feeling fatigue and tired the past two days but denies rhinorrhea, sore throat, fever, chills, changes in his breathing. He reached out to pulm yesterday and was ordered a CXR which he will have done after our visit today.   11/19/24: Pt here today for follow up. He was recently hospitalized post fall and trauma to head. He underwent some work up. PE study which was negative. CT head with no acute findings. He is home now, and has not had recurrence of the symptoms.   12/11/24: Patient seen today for follow up. Since last visit he has not had any other episodes of syncope. He has seen cardiology, Dr. Guadarrama, and is currently wearing a heart monitor. He had brain MR done that did not show acute pathology. He was supposed to see neuro onc however he cancelled the appointment because he had a cold. His cough is persisting, breathing unchanged. He has seen Dr. Marinelli and is planned for bronch next week. He plans to see his PCP this week for clearance. He states after RT, he felt generally unwell and weak, improving slowly.   1/15/25: Patient seen today for follow up. Upon presentation he reports that he feels weak, sluggish, tired, nauseous, occasional dizziness, and is having substernal chest pain since having endobronchial stent procedure 3-4 weeks ago. VSS in office, HR in the 90s. Of note, he was wearing a heart monitor for evaluation of syncope with Thierry. Per chart note from Dr. Guadarrama "Zio monitor showed 3 second pause and there was sinus bradycardia". There was a plan for outpatient management with EP. However given patients symptoms and presentation today, we will send patient to ER. He feels his breathing is stable, SOB with exertion but not as rest. Continues to have cough and continues on benzonatate.   4/22/25: Patient seen today for follow up. Upon presentation he reports facial swelling, periorbital edema, RUE swelling, right and left chest pain. Breathing is unchanged, still experiencing GARCIAS that limits ambulation.  [de-identified] : sq cell ca

## 2025-04-22 NOTE — PHYSICAL EXAM
[Restricted in physically strenuous activity but ambulatory and able to carry out work of a light or sedentary nature] : Status 1- Restricted in physically strenuous activity but ambulatory and able to carry out work of a light or sedentary nature, e.g., light house work, office work [Normal] : affect appropriate [de-identified] : Bilaterial periorbital edema. Right facial swelling. RUE swelling [de-identified] : BL expiratory wheeze  [de-identified] : Patchy healing rash

## 2025-04-22 NOTE — DISCUSSION/SUMMARY
[FreeTextEntry1] : The patients most recent CT scan was reviewed by my independently and my interpretation is stated below:  R

## 2025-04-22 NOTE — HISTORY OF PRESENT ILLNESS
[Former] : former [TextBox_4] : Interventional Pulmonology Consultation Note Follow Up Visit with IP:  Apr 14, 2025, 1:00PM   Mr. OSBORNE is a 70-year-old male [id number] pack year smoking hx, as well as COPD, HTN, GERD, anxiety/depression, and stage IIIB (T4N2M0) SCC of the lung dx in 2019 with dominant R hilar mass with encasement of R pulm arteries. He completed definitive ChemoRT with good response (60Gy/30 fx completed in 1/2020) followed by maintenance Durvalumab- however was stopped in 12/2020 due to AEs. Patient was lost to follow up in 2021.  He was most recently admitted to [facility] on 4/24 with acute hypoxic resp failure- imaging moore demonstrated increased size of R perihilar mass a/w severe narrowing of the R mainstem bronchus. Interventional Pulm was consulted s/p Flex Bronch 4/25 s/p balloon dilation, tumor debunking and BI [id number] Bonastent placement; EBUS guided R hilar mass bx done  4/25/24 bx of right hilar mass consistent with NSCLC, squamous cell carcinoma.  CTa chest 4/25 showed a 6.8 x 4.3 cm R perihilar mass that had increased in size.  CT A/P from 5/1 showed no evidence disease in the abdomen or pelvis.  Seen by rad onc inpatient and nothing to be done at that time, outpatient palliative RT. Scheduled to see med onc 5/17/24.  5/6/24 initial radiation consult: Patient here today with nephew. He is feeling well overall but is having some worsening symptoms. He complains of fatigue, weakness and mild intermittent discomfort in the R chest. He also complains of a productive cough, with sob and worsening GARCIAS. He denies hemoptysis and dysphagia, currently tolerating diet well.  Pt was seen by IP 06/26/2024 where he had a Bronchoscopy done for stent evaluation result from that bronchoscopy was notable for: - Fibrin, blood, and scanty fragments of epithelium with squamous metaplasia.  Negative for malignancy.  Since last seen he underwent a PET/CT which demonstrated: - Enlarging precarinal node (2.7 x 2.6 cm, SUV 21.1, previously 1.5 x 1.2 cm, SUV 16.3).  - New small proximal LEFT peribronchial node (1.1 cm, SUV 4.1). New small subcarinal node below resolution of PET (SUV 4.2, image 94). - Right bronchial stent is again noted. Atherosclerosis of coronary arteries and nonaneurysmal thoracic aorta. - Revisualized necrotic RIGHT perihilar mass (6.9 x 6.2, SUV 21.1,previously 6.1 x 4.3 cm, SUV 19.6, 7.5 cm craniocaudal). - There is a small focus of uptake seen in the RIGHT MIDDLE lobe, which may be related to additional malignant involvement versus air bronchogram formation (SUV 4.3). - There are increased air bronchograms seen in the RIGHT lung field. The LEFT lung field is unremarkable. -  Right pleural, and pericardial effusion.  Today he verbalized that he is doing well from a pulmonary standpoint he still has ongoing episodes of cough however they have subsided compared to how he felt from the last visit.  He endorsed that he has been compliant with treatment.

## 2025-04-22 NOTE — HISTORY OF PRESENT ILLNESS
[Former] : former [TextBox_4] : Interventional Pulmonology Consultation Note Follow Up Visit with IP:  Apr 14, 2025, 1:00PM   Mr. OSBORNE is a 70-year-old male [id number] pack year smoking hx, as well as COPD, HTN, GERD, anxiety/depression, and stage IIIB (T4N2M0) SCC of the lung dx in 2019 with dominant R hilar mass with encasement of R pulm arteries. He completed definitive ChemoRT with good response (60Gy/30 fx completed in 1/2020) followed by maintenance Durvalumab- however was stopped in 12/2020 due to AEs. Patient was lost to follow up in 2021.  He was most recently admitted to [facility] on 4/24 with acute hypoxic resp failure- imaging moore demonstrated increased size of R perihilar mass a/w severe narrowing of the R mainstem bronchus. Interventional Pulm was consulted s/p Flex Bronch 4/25 s/p balloon dilation, tumor debunking and BI [id number] Bonastent placement; EBUS guided R hilar mass bx done  4/25/24 bx of right hilar mass consistent with NSCLC, squamous cell carcinoma.  CTa chest 4/25 showed a 6.8 x 4.3 cm R perihilar mass that had increased in size.  CT A/P from 5/1 showed no evidence disease in the abdomen or pelvis.  Seen by rad onc inpatient and nothing to be done at that time, outpatient palliative RT. Scheduled to see med onc 5/17/24.  5/6/24 initial radiation consult: Patient here today with nephew. He is feeling well overall but is having some worsening symptoms. He complains of fatigue, weakness and mild intermittent discomfort in the R chest. He also complains of a productive cough, with sob and worsening GARCIAS. He denies hemoptysis and dysphagia, currently tolerating diet well.  Pt was seen by IP 06/26/2024 where he had a Bronchoscopy done for stent evaluation result from that bronchoscopy was notable for: - Fibrin, blood, and scanty fragments of epithelium with squamous metaplasia.  Negative for malignancy.  Since last seen he underwent a PET/CT which demonstrated: - Enlarging precarinal node (2.7 x 2.6 cm, SUV 21.1, previously 1.5 x 1.2 cm, SUV 16.3).  - New small proximal LEFT peribronchial node (1.1 cm, SUV 4.1). New small subcarinal node below resolution of PET (SUV 4.2, image 94). - Right bronchial stent is again noted. Atherosclerosis of coronary arteries and nonaneurysmal thoracic aorta. - Revisualized necrotic RIGHT perihilar mass (6.9 x 6.2, SUV 21.1,previously 6.1 x 4.3 cm, SUV 19.6, 7.5 cm craniocaudal). - There is a small focus of uptake seen in the RIGHT MIDDLE lobe, which may be related to additional malignant involvement versus air bronchogram formation (SUV 4.3). - There are increased air bronchograms seen in the RIGHT lung field. The LEFT lung field is unremarkable. -  Right pleural, and pericardial effusion.  Today he verbalized that he is doing well from a pulmonary standpoint he still has ongoing episodes of cough however they have subsided compared to how he felt from the last visit.  He endorsed that he has been compliant with treatment.

## 2025-05-07 NOTE — REVIEW OF SYSTEMS
[Fatigue] : fatigue [Wheezing] : wheezing [Cough] : cough [Dyspnea on Exertion] : dyspnea on exertion [Constipation] : constipation [Negative] : Psychiatric [FreeTextEntry4] : right ear muffled [FreeTextEntry5] : intermittent chest tightness [FreeTextEntry8] : slow stream

## 2025-05-07 NOTE — REASON FOR VISIT
[Post-hospitalization from ___ Hospital] : Post-hospitalization from [unfilled] Hospital [Admitted on: ___] : The patient was admitted on [unfilled] [Discharged on ___] : discharged on [unfilled] [FreeTextEntry2] :  Mr. Manning is a 71 y/o man with COPD on home oxygen, HTN, HLD, GERD, Anxiety/depression, Right subclavian/bacheocephalic DVT on eliquis, and stage IV Right lung SCC s/p Right bronchus stent, 7C carbo/abraxane (11/14/19), CCRT (ld 1/3/20), durvalumab, C4 carbo/taxol (ld 9/24/24), 5fxs RT to R lung (ld 9/19/24). Tentatively scheduled to start Keytruda 5/2/25. P/w facial swelling x 2 weeks, notes some blurry vision but now at baseline. Also reports occasional chest tightness, dry cough, dizziness, and poor appetite with 30 lb weight loss. Trops and pro-BNP were negative. CTA chest revealed large Right pleural effusion and mucoid impacted Right main stem bronchial stent and moderate pericardial effusion. s/p bronchoscopy with stent placement revision and thoracentesis of Right pleural effusion with 1.9L removed (culture with NTD and cytology was negative for malignancy). Facial swelling improved. Discharged home without skilled needs.

## 2025-05-07 NOTE — COUNSELING
[Underweight (BMI < 18.5)] : Underweight (BMI < 18.5) [Improve mobility] : improve mobility [Decrease hospital use] : decrease hospital use [Minimize unnecessary interventions] : minimize unnecessary interventions

## 2025-05-07 NOTE — PHYSICAL EXAM
[Normal Sclera/Conjunctiva] : normal sclera/conjunctiva [PERRL] : pupils equal, round and reactive to light [No JVD] : no jugular venous distention [No Respiratory Distress] : no respiratory distress [Normal Rate] : heart rate was normal  [Normal S1, S2] : normal S1 and S2 [Pedal Pulses Present] : the pedal pulses are present [Oriented x3] : oriented to person, place, and time [Normal Bowel Sounds] : normal bowel sounds [Non Tender] : non-tender [Soft] : abdomen soft [Not Distended] : not distended [Normal Gait] : normal gait [de-identified] : chronically ill, more fatigued [de-identified] : with ambulation +accessory muscle use, right side with slight expiratory wheeze [de-identified] : left hand swelling [de-identified] : ecchymosis bilateral UE

## 2025-05-07 NOTE — HISTORY OF PRESENT ILLNESS
[Patient] : patient [FreeTextEntry1] : the maximum effort to leave home due to comorbidities.    [FreeTextEntry2] : Mr. Manning is being seen in the home today for post hospitalization follow up. He presents with persistent facial swelling, fatigue, and occasional chest discomfort since hospital discharge on April 26th. He reports that the facial swelling returned as soon as he came home. The swelling is more pronounced on the right side of his face but is present bilaterally. He denies any vision changes or difficulty swallowing. The patient reports feeling very sluggish and having difficulty getting out of bed, which has been ongoing for a while. He uses oxygen as needed, typically at 4 liters, a couple of times a day and at night. The patient describes experiencing chest discomfort, which he characterizes as pressure and achiness, occurring every so often. He rates the pain as a 4 out of 10 when it occurs.  He recently started chemotherapy with Keytruda, which is scheduled every three weeks. The patient reports poor appetite and a 30-pound weight loss prior to his recent hospitalization.    Social History:     - Lives alone     - Has one brother who is concerned about his care     - Declined offer for home health aide assistance     - Expresses desire to maintain cleanliness but struggles due to fatigue

## 2025-05-07 NOTE — CHRONIC CARE ASSESSMENT
[Oriented To Person] : ~L oriented to person [Oriented To Place] : ~L oriented to place [Oriented To Time] : ~L oriented to time [Alert] : ~L alert [Reviewed Home Safety Evaluation] : Reviewed home safety evaluation [No Action Needed] : No action needed [Uses glasses] : uses glasses [Inadequate social support] : inadequate social support [Noted Weight Loss] : noted weight loss [PHQ9 Completed] : PHQ9 not competed [de-identified] : declined [de-identified] : declined SW support

## 2025-05-07 NOTE — HEALTH RISK ASSESSMENT
[Independent] : managing medications [Some assistance needed] : doing laundry [Full assistance needed] : using transportation [de-identified] : unable to manage at this point [FreeTextEntry2] : Syncopal fall in November 2024

## 2025-05-10 NOTE — HISTORY OF PRESENT ILLNESS
[Former] : former [TextBox_4] : Interventional Pulmonology Consultation Note Follow Up Visit with IP:  May 12 2025 1:00PM   Mr. OSBORNE is a 70-year-old male [id number] pack year smoking hx, as well as COPD, HTN, GERD, anxiety/depression, and stage IIIB (T4N2M0) SCC of the lung dx in 2019 with dominant R hilar mass with encasement of R pulm arteries. He completed definitive ChemoRT with good response (60Gy/30 fx completed in 1/2020) followed by maintenance Durvalumab- however was stopped in 12/2020 due to AEs. Patient was lost to follow up in 2021.  He was admitted to [facility] on 4/24/2024 with acute hypoxic resp failure- imaging moore demonstrated the increased size of R perihilar mass a/w severe narrowing of the R mainstem bronchus. Interventional Pulm was consulted s/p Flex Bronch 4/25/2024 s/p balloon dilation, tumor debunking, and BI [id number] Bonastent placement: EBUS guided R hilar mass bx done. Results were consistent with NSCLC, squamous cell carcinoma. Once discharged, he was seen by rad onc and medical oncology in the outpatient setting and has been compliant with treatment.  Procedure History: - 04/25/2025-Flex Bronch/ balloon dilation, tumor debunking, and BI [id number] Bonastent placement: EBUS guided R hilar mass bx done. -06/26/2024- Flex Bronchoscopy for stent evaluation  - 09/25/2024- flexible bronchoscopy, thick secretions seen obstructing right BI stent therapeutically aspirated with cryo probe, right mainstem balloon dilated. no right upper bronchus. BAL RLL, therapeutic aspiration of mucus, - 12/19/2024- Flex Bronchoscopy for stent evaluation  - 02/24/2025- Thoracentesis where he had a total of 600cc for exudative/nonspecific fluid removed. - 02/027/2025- Flexible bronchoscopy with stent evaluation with the removal of granulation tissue, balloon dilation, cryotherapy, aspiration of purulent secretions. -03/04/2025- suspension laryngoscopy flex/rigid bronch/balloon dilation/BAL due to significant obstruction of the RMB. During the procedure, he had the removal of his old 10x20 haley stent, endobronchial biopsy, and placement of a 10x30 aero stent.  Mr. OSBORNE is following up with Interventional Pulmonology today status post flexible bronchoscopy, stent evaluation, and right pleural effusion drainage. During the procedure, the patient had a total of 1.9 L of exudative monocyte predominant fluid drained. The biopsy results were notable for positive malignant cells squamous carcinoma with necrosis. Immunohistochemistry is positive for p40 while negative for TTF-1, which is consistent with a diagnosis of squamous cell carcinoma; the tumor proportion score is less than 1%. Culture results were negative to date.   Today he

## 2025-05-10 NOTE — DISCUSSION/SUMMARY
[FreeTextEntry1] : On 04/24/2025 patient had a thoracentesis on the right-side of the chest where they had 2000 mL of Serosanguineous fluid drained. Thus far to today date fluid is negative for growth bacterial culture, viral smears & culture, and fungal & AFB. In addition to that fluid was noted to be exudative in nature due to: Fluid Cholesterol:  55 U/L Fluid Total Protein:  4.8 g/dL Fluid Lactate Dehydrogenase:  213 mg/dL Fluid Albumin: 2.2  g/dL Fluid pH:7.6 Fluid Glucose: 109 mg/dL     Peritoneal/pleural/pericardial fluid/other body fluid types:     Tube Type: Lavender Fluid type: Pleural Fluid  Body Fluid Appearance:  Hazy Color - Body Fluid: yellow Total Nucleated Cell, Count Body Fluid: 911 cells/uL  Total RBC Count:    <2000 High cells/uL      Neutrophil- Body Fluid:8 BF Lymphocytes:34 % Monocyte/Macrophage Count - Body Fluid:    40 %-Final   Eosinophil Count - Body Fluid:      0 % -Final    Other Body Cells:   0  % Mesothelial Cells - Body Fluid:     18  % -Final      Total Cells Counted, Body Fluid:  100  %-Final      Atypical Lymphocytes - Body Fluid:  0  %-Final          Cytology report was notable for: - NEGATIVE FOR MALIGNANT CELLS. Reactive mesothelial cells.  The cytology slides and cell block consist of benign mesothelial cells, macrophages and lymphocytes.   The patients most recent PET/CT scan was reviewed by my independently and my interpretation is stated below:  - right lung mass is enlarged compared to prior images.

## 2025-05-10 NOTE — HISTORY OF PRESENT ILLNESS
[Former] : former [TextBox_4] : Interventional Pulmonology Consultation Note Follow Up Visit with IP:  May 12 2025 1:00PM   Mr. OSBORNE is a 70-year-old male [id number] pack year smoking hx, as well as COPD, HTN, GERD, anxiety/depression, and stage IIIB (T4N2M0) SCC of the lung dx in 2019 with dominant R hilar mass with encasement of R pulm arteries. He completed definitive ChemoRT with good response (60Gy/30 fx completed in 1/2020) followed by maintenance Durvalumab- however was stopped in 12/2020 due to AEs. Patient was lost to follow up in 2021.  He was admitted to [facility] on 4/24/2024 with acute hypoxic resp failure- imaging moore demonstrated the increased size of R perihilar mass a/w severe narrowing of the R mainstem bronchus. Interventional Pulm was consulted s/p Flex Bronch 4/25/2024 s/p balloon dilation, tumor debunking, and BI [id number] Bonastent placement: EBUS guided R hilar mass bx done. Results were consistent with NSCLC, squamous cell carcinoma. Once discharged, he was seen by rad onc and medical oncology in the outpatient setting and has been compliant with treatment.  Procedure History: - 04/25/2025-Flex Bronch/ balloon dilation, tumor debunking, and BI [id number] Bonastent placement: EBUS guided R hilar mass bx done. -06/26/2024- Flex Bronchoscopy for stent evaluation  - 09/25/2024- flexible bronchoscopy, thick secretions seen obstructing right BI stent therapeutically aspirated with cryo probe, right mainstem balloon dilated. no right upper bronchus. BAL RLL, therapeutic aspiration of mucus, - 12/19/2024- Flex Bronchoscopy for stent evaluation  - 02/24/2025- Thoracentesis where he had a total of 600cc for exudative/nonspecific fluid removed. - 02/027/2025- Flexible bronchoscopy with stent evaluation with the removal of granulation tissue, balloon dilation, cryotherapy, aspiration of purulent secretions. -03/04/2025- suspension laryngoscopy flex/rigid bronch/balloon dilation/BAL due to significant obstruction of the RMB. During the procedure, he had the removal of his old 10x20 haley stent, endobronchial biopsy, and placement of a 10x30 aero stent.  Mr. OSBORNE is following up with Interventional Pulmonology today status post flexible bronchoscopy, stent evaluation, and right pleural effusion drainage. During the procedure, the patient had a total of 1.9 L of exudative monocyte predominant fluid drained. The biopsy results were notable for positive malignant cells squamous carcinoma with necrosis. Immunohistochemistry is positive for p40 while negative for TTF-1, which is consistent with a diagnosis of squamous cell carcinoma; the tumor proportion score is less than 1%. Culture results were negative to date.   Today he

## 2025-05-10 NOTE — PROCEDURE
[Thoracic Ultrasound] : Thoracic Ultrasound [A line] : A line: Yes [Lung sliding] : Lung sliding: Yes [Normal] : Normal [B line] : B line: No [Pleural Effusion] : Pleural Effusion: No [Consolidation] : Consolidation: No [de-identified] : Dyspnea on exertion [FreeTextEntry2] : large effusion to right side of the chest.

## 2025-05-10 NOTE — ASSESSMENT
[FreeTextEntry1] : 69 y/o M with recurrence of non-small cell lung cancer, COPD, HTN, GERD, anxiety/depression. Since last seen, he has had multiple rehospitalizations done due to acute hypoxemic respiratory failure due to post-obstructive pneumonia/ endobronchial bronchus intermedius obstruction and had undergone a thoracentesis. He recently had a sent replacement. Post discharge, he has been following up with Oncology and underwent PET, which was notable for enlargement of the perihilar mass and precarinal node. and right pleural, and pericardial effusion.

## 2025-05-10 NOTE — ASSESSMENT
[FreeTextEntry1] : 71 y/o M with recurrence of non-small cell lung cancer, COPD, HTN, GERD, anxiety/depression. Since last seen, he has had multiple rehospitalizations done due to acute hypoxemic respiratory failure due to post-obstructive pneumonia/ endobronchial bronchus intermedius obstruction and had undergone a thoracentesis. He recently had a sent replacement. Post discharge, he has been following up with Oncology and underwent PET, which was notable for enlargement of the perihilar mass and precarinal node. and right pleural, and pericardial effusion.

## 2025-05-10 NOTE — PROCEDURE
[Thoracic Ultrasound] : Thoracic Ultrasound [A line] : A line: Yes [Lung sliding] : Lung sliding: Yes [Normal] : Normal [B line] : B line: No [Pleural Effusion] : Pleural Effusion: No [Consolidation] : Consolidation: No [de-identified] : Dyspnea on exertion [FreeTextEntry2] : large effusion to right side of the chest.

## 2025-05-16 NOTE — REVIEW OF SYSTEMS
[Feeling Fatigued] : feeling fatigued [Dizziness] : dizziness [SOB] : no shortness of breath [Chest Discomfort] : no chest discomfort [Lower Ext Edema] : no extremity edema [Palpitations] : no palpitations [Syncope] : no syncope [Cough] : no cough

## 2025-05-16 NOTE — ASSESSMENT
[FreeTextEntry1] : ================================================================= 69 year old man with: - metastatic NSCLC s/p chemotherapy/immunotherapy, prior IO toxicity (dermatologic/pulmonary), now with POD requiring rechallenge with PD-1 inhibitor (pembrolizumab). No evident IRAE thus far (s/p 1 cycle) - Syncope and abnormal ECG (LVH) - 3 second pause on Holter, but likely unrelated. - pericardial effusion w/o tamponade - likely malignant. ECG voltage normal today (previously LVH) - CKD stage 3 - hypercholesterolemia and history of radiation therapy - managed with rosuvastatin - Hypertension - BP controlled on amlodipine 10 - aortic aneurysm, up to 4.5 cm, but stable since 2020   Recommendations 1. schedule repeat echo to monitor pericardial effusion 2. continue amlodipine, apixaban, rosuvastatin 3. monitor symptoms and response to therapy 4. Follow up appointment in 3 months with me  Interval medical records and reports reviewed to formulate the assessment and plan of care. Above recommendations were discussed with the patient and all questions were answered to the best of my ability and to his apparent satisfaction.

## 2025-05-16 NOTE — PHYSICAL EXAM
[Ill-Appearing] : ill-appearing [Normal Conjunctiva] : normal conjunctiva [Good Air Entry] : good air entry [de-identified] : Right upper extremity edema

## 2025-05-16 NOTE — REASON FOR VISIT
[FreeTextEntry3] : Dr. Boles [FreeTextEntry1] : =========================================================================== SHARLENE OSBORNE is a 70 year old man with HTN, COPD, with NSC lung cancer complicated by IO toxicity (skin, pulmonary), ascending aortic aneurysm, now with POD requiring recurrent Pulmonary interventions for bronchial obstruction who is seen for follow up of pericardial effusion.  Prior Cancer Treatments: ------------------------------------------------------------------------ Chemo/targeted therapy: 10/2019: carbo/paclitaxel 2/25/2020-2021: durvalumab (discontinued due to IRAE) 5/2024-8/2024: carbo/paclitaxel 5/2/2025: pembrolizumab? x1 ------------------------------------------------------------------------ Surgery: 4/25/2024: endobronchial tumor debulking and bronchial stent placement 2/2025: Thoracentesis, endobronchial balloon dilatation, cryotherapy   4/25/2025: Flex Bronch/ balloon dilation, tumor debunking, stenting, EBUS guided biopsy of R hilar mass  ------------------------------------------------------------------------ Radiation: 1/2020: 60 Gy to right lung 9/2024: right lung 20 Gy

## 2025-05-16 NOTE — HISTORY OF PRESENT ILLNESS
[FreeTextEntry1] : Interval History: He was hospitalized for edema and SVC syndrome in April, underwent another endobronchial procedure. Echo done 2025: Moderate pericardial effusion noted adjacent to the anterior right ventricle, small pericardial effusion noted adjacent to the posterior left ventricle and large pericardial effusion noted adjacent to the right ventricle with no echocardiographic evidence of tamponade physiology. Started pembrolizumab on  for POD Now on apixaban for UE DVT. Pending Mediport placement. No chest pain or palpitations. Occasional dizziness, but no further syncope.  Medications reviewed and updated.   History: The patient was referred for evaluation of an episode of fainting at home in 2024. He feels the episode was related to dehydration or breathing issues. He reports no prodrome and no palpitations. He regained consciousness after a few seconds and was on the floor. He did not sustain serious trauma or injury. He was evaluated at [facility] and discharged.  He has no prior history of syncope or heart problems. But his primary care doctor recommended he see a cardiologist for an "enlarged heart." His ECGs have shown LVH with repolarization abnormalities since prior to initiating cancer treatment.  He was diagnosed with non-small cell lung cancer in , treated with chemotherapy and radiation, and has been off chemotherapy since 2024. The patient previously received immunotherapy with durvalumab but discontinued due to skin (bullous pemphigoid) and lung issues. The patient has a chronic cough, and underwent an endobronchial stent placement.  Past Medical History: - History of immunotherapy (Durvalumab) with skin and lung reactions - Stage 3 chronic kidney disease - History of high blood pressure - Previous cyst removal from the scalp 30 years ago - dilated aortic root noted on echocardiogram, 4.3 cm  Social History: Lives in [place] [facility] with two sisters. Retired . Former smoker, quit at time of cancer diagnosis ()  Family History: No family history of early/premature CAD He is not aware of any family history of cardiomyopathy or early/sudden cardiac death   Cardiovascular Summary: ---------------------------------------------- EC2025: NSR 80 bpm w occasional PACs, non-specific T wave abnormality 2024: NSR 96 bpm, lateral T wave abnormality ---------------------------------------------- Echo: 2025: EF 59 %, normal RV size/function, ascending aorta 4.4 cm, moderate pericardial effusion adjacent to RV organized fibrinous material in pericardial space, bilateral pleural effusion 2025: EF > 70 %, small pericardial effusion, ascending aorta 4.34 cm 2024: LVEF 75 %, mild AS, no pericardial effusion, ascending aorta 4.3 cm ---------------------------------------------- Stress: ---------------------------------------------- CT/MRI 2024: Coronary thoracic aortic calcifications. Dilated ascending thoracic aorta measuring up to 4.4 cm. 2020: 4.5 cm aneurysmal dilation of ascending aorta, atheromatous ectasia of aorta and iliac arteries 2020: 4.5 cm aneurysmal dilation of aorta (unchanged) ---------------------------------------------- Remote/ambulatory rhythm monitorin2024-present: Zio monitoring for syncope, 3 second pause.

## 2025-05-19 NOTE — PAST MEDICAL HISTORY
[Malignancy] : Malignancy [No therapy indicated for cases scheduled for less than one hour] : No therapy indicated for cases scheduled for less than one hour. [Increasing age ( >40 years old)] : Increasing age ( >40 years old) [FreeTextEntry1] : Malignant Hyperthermia Screening Tool and Risk of Bleeding Assessment  Mr. SHARLENE OSBORNE denies family history of unexpected death following Anesthesia or Exercise. Denies family history of Malignant Hyperthermia, Muscle or Neuromuscular disorder and High Temperature following exercise.  Mr. SHARLENE OSBORNE denies history of Muscle Spasm, Dark or Chocolate - Colored urine and Unanticipated fever immediately following anesthesia or serious exercise. Mr. OSBORNE also denies bleeding tendencies / Risk of Bleeding.

## 2025-05-19 NOTE — HISTORY OF PRESENT ILLNESS
[FreeTextEntry1] : accompanied by stacey Mera [phone] alert and oriented no medications taken this morning uses 4L O2 prn  last eliquis on thursday  [FreeTextEntry5] : yesterday at 10pm [FreeTextEntry6] : Dr Dallas

## 2025-05-19 NOTE — ASSESSMENT
[FreeTextEntry1] : Pt seen and assessed for placement of port for venous access.  Chart reviewed, imaging and labs evaluated and acceptable for intervention. Consent obtained with risks, benefits explained.  Will place port with sedation. Anesthesia plan formulated and discussed with anesthesiology.  6.6 [place] powerport placed using US and fluoro guidance. Tip in SVC.  EBL=minimal.  Full report to follow.

## 2025-05-19 NOTE — HISTORY OF PRESENT ILLNESS
[FreeTextEntry1] : accompanied by stacey Mera [phone] alert and oriented no medications taken this morning uses 4L O2 prn  last eliquis on thursday  [FreeTextEntry5] : yesterday at 10pm [FreeTextEntry6] : Dr Dallas

## 2025-05-19 NOTE — ASSESSMENT
[FreeTextEntry1] : Pt seen and assessed for placement of port for venous access.  Chart reviewed, imaging and labs evaluated and acceptable for intervention. Consent obtained with risks, benefits explained.  Will place port with sedation. Anesthesia plan formulated and discussed with anesthesiology.  6.6 [place] powerport placed using US and fluoro guidance. Tip in SVC.  EBL=minimal.  Full report to follow.

## 2025-05-19 NOTE — PROCEDURE
[D/C IV on discharge] : D/C IV on discharge [Resume diet] : resume diet [Discharged at: ____] : Discharged at [unfilled] [Site check for bleeding/hematoma] : Site check for bleeding/hematoma [Vital signs on admission the q 15 mins x2] : Vital signs on admission the q 15 mins x2 [FreeTextEntry1] : Right chest mediport insertion

## 2025-05-23 NOTE — HISTORY OF PRESENT ILLNESS
[Disease: _____________________] : Disease: [unfilled] [T: ___] : T[unfilled] [N: ___] : N[unfilled] [M: ___] : M[unfilled] [AJCC Stage: ____] : AJCC Stage: [unfilled] [Date: ____________] : Patient's last distress assessment performed on [unfilled]. [8 - Distress Level] : Distress Level: 8 [de-identified] : 70 year old male, with past history significant for Hypertension (untreated), GERD, Anxiety, Depression and Smoking (active), COPD, presented to the ED at [facility] on 9/5/19 with progressive shortness of breath and cough - with wheezing and occasional hemoptysis for the past ~ one month. Reports pleuritic type pains of the right lower chest and back with coughing and repositioning of the body. Patient has had an unquantified weight loss over several months (5 lbs per patient, 10 lbs per nephew). CXR at [facility] showed a lung mass. CTA of chest significant for "5.5 x 4.7 cm sized right hilar mass with mediastinal invasion. Encasement of right pulmonary artery and lobar branches as well as central airways with mucosal plug. Possible endobronchial spread to the right upper lobe." Pt was transferred to [facility] for thoracic surgery eval. Patient was seen by thoracic surgery and deemed not a surgical candidate. CT A/P showed scattered liver lesions. Pulmonology was consulted and EBUS with biopsy of the lung mass was performed on 9/10. Rapid cytology showed malignant cells. Further IHC analysis showed P40 pos and TTF1 negative, confirming sq cell histology. PDL1 was 0%.  Of note, brain MRI was normal.  Since discharge, pt has been feeling tired, and continues to have cough and dyspnea. He is trying to maintaining weight.   Pt is a recent smoker. Smoked 2 ppd of cigarettes for ~ 50 years. Last cigarette was on 9/5- has Nictonine patch now. Pt has not seen a doctor for 25 years prior to the recent admission and has not had any prior CT scans.   10/3/19: Not smoking., Started chemo on Tuesday., Feels better already- still some cough. Gained weight. No AE  10/22/19: Pt feeling well. Continued cough. Small weight loss. States appetite fluctuates. Not smoking. Difficulty sleeping due to anxiety and worry.   11/14/19: Pt returns for follow-up. He has completed 7 weeks of carbo/abraxane and is scheduled for XRT sim tomorrow. He will begin CCRT with same agents. He is feeling well. Gained weight. MIld fatigue. Cough improved. denies pain  1/31/2020: Pt returns for follow up. He completed CCRT on 1/3/2020. States he is feeling well. Denies pain/cough/SOB. Mild residual fatigue from time to time. Reports he QUIT SMOKING!!! It has been 2 months since he had a cigarette. States appetite is good despite no weight gain.   5/5/2020: Pt being seen in treatment room today. He is feeling very well. Offers no complaints. Has been on durvalumab since 2/25/2020 and is tolerating welll. Due to Covid Pandemic regimen has been changed to Q month schedule. He denies cough, SOB, headaches, visual changes. S/P CCRT on 1/3/2020.  6/16/20: No new complaints. HAd CT scans last week, Creatinine elevated on recent blood tests. Pt denies any NSAIDs  7/14/2020: Pt returns for follow up and treatment. He is doing well. Tolerating tx wo any iAE. Pt has been on Durvalumab since 2/25/2020. He will complete one year of therapy. Last imaging in June. No new complaints. ROS: non-contributory.   8/11/20: No complaints. Creatinine still elevated. Saw renal and bx was recommended to r/o possible immune related kidney injury  11/3/2020: Pt doing well. Complains of chronic heartburn. Using rolaids/tums. States has no follow up scheduled with nephrology. Pt states has never had an endoscopy or colonoscopy.   12/1/2020: Pt returns for follow up and discussion of findings on recent imaging. He states he is feeling well but has developed a rash on his RUE that is painful and itchy.   12/29/2020: Pt returns for follow up. Last immunotherapy held due to possible irAE skin toxicity. Started on prednisone 40mg daily. Pt has since developed severe plaque/blister rash. Saw derm had biopsy...results pending. Pt developed profound SOB/ Cough. No fevers.  4/8/21: Pt returns for follow up. Since last visit patient has had multiple admissions to the hospital. In dec he was admitted for severe immune-mediated skin and lung toxicity. He was seen by derm and diagnosed with bullous pemphigoid. He continues to follow with derm and was recently started on Dupixant. He remains on Prednisone 10mg daily. Shortly after discharge he developed severe COVID infection and was hospitalized again 1/21/21-1/26/21. He was evaluated by the [facility] but has not maintained follow up. She continues to struggle with profound fatigue and significant GARCIAS. He has been off immunotherapy since 12/15/20.   5/13/21: Cutaneous lesions have markedly improved. Continues to follow with Dr. Bradshaw. Gained weight. Feels well. No complaints  9/17/21: Cutaneous lesions continue to improve- right now just some induration and discoloration. Appetite good and gained weight.   5/17/24: Pt was lost to follow up. He was most recently admitted to [facility] on 4/24 with acute hypoxic resp failure- imaging moore demonstrated increased size of R perihilar mass a/w severe narrowing of the R mainstem bronchus. Interventional Pulm was consulted s/p Flex Bronch 4/25 s/p balloon dilation, tumor debunking and BI [id number] Bonastent placement; EBUS guided R hilar mass bx done. bx of right hilar mass consistent with NSCLC, squamous cell carcinoma.  CTa chest 4/25 showed a 6.8 x 4.3 cm R perihilar mass that had increased in size. CT A/P from 5/1 showed no evidence disease in the abdomen or pelvis.  Seen by rad onc inpatient and nothing to be done at that time, outpatient palliative RT. Scheduled to see med onc 5/17/24. He complains of fatigue, weakness and mild intermittent discomfort in the R chest. He also complains of a productive cough, but improved sob and wheeze. He denies hemoptysis and dysphagia, currently tolerating diet well.    6/14/24: Pt is feeling well. he has decreased appetite and has lost some weight. cough and dyspnea improved. Voice has improved/   7/5/24: Patient seen today for follow up while receiving treatment. He reports some fatigue and constipation that is relieved with docusate following chemo. He has also been having cough. Last week he had bronch and stent was cleaned. Appetite is stable, no N/V/D.   7/26/24: Patient seen today for follow up while receiving treatment. He reports overall feeling well. Breathing is stable and he denies N/V/D.   8/15/24: Doing well. Feeling better. Respiratory symptoms have improved.. He has gained weight. Completed 4 cycle of chemo (Carbo/taxol)  9/24/24: Patient seen today for follow up. He has completed 5 frx of RT to the right lung. He reports experiencing fatigue following RT. He continues to have cough despite benzonatate. He experiences SOB when coughing but otherwise breathing is stable. He denies N/V/D, F/C.   10/23/24: Patient seen today for follow up. He reports that he is having a slight intermittent discomfort on the anterior right chest wall that predated RT. He has ongoing cough benzonatate is somewhat helpful. He reports feeling fatigue and tired the past two days but denies rhinorrhea, sore throat, fever, chills, changes in his breathing. He reached out to pulm yesterday and was ordered a CXR which he will have done after our visit today.   11/19/24: Pt here today for follow up. He was recently hospitalized post fall and trauma to head. He underwent some work up. PE study which was negative. CT head with no acute findings. He is home now, and has not had recurrence of the symptoms.   12/11/24: Patient seen today for follow up. Since last visit he has not had any other episodes of syncope. He has seen cardiology, Dr. Guadarrama, and is currently wearing a heart monitor. He had brain MR done that did not show acute pathology. He was supposed to see neuro onc however he cancelled the appointment because he had a cold. His cough is persisting, breathing unchanged. He has seen Dr. Marinelli and is planned for bronch next week. He plans to see his PCP this week for clearance. He states after RT, he felt generally unwell and weak, improving slowly.   1/15/25: Patient seen today for follow up. Upon presentation he reports that he feels weak, sluggish, tired, nauseous, occasional dizziness, and is having substernal chest pain since having endobronchial stent procedure 3-4 weeks ago. VSS in office, HR in the 90s. Of note, he was wearing a heart monitor for evaluation of syncope with Thierry. Per chart note from Dr. Guadarrama "Zio monitor showed 3 second pause and there was sinus bradycardia". There was a plan for outpatient management with EP. However given patients symptoms and presentation today, we will send patient to ER. He feels his breathing is stable, SOB with exertion but not as rest. Continues to have cough and continues on benzonatate.   4/22/25: Patient seen today for follow up. Upon presentation he reports facial swelling, periorbital edema, RUE swelling, right and left chest pain. Breathing is unchanged, still experiencing GARCIAS that limits ambulation.   5/23/25: Patient seen for follow up while receiving C2 of Pembro. Pt was recently hospitalized for SVC syndrome, pleural and pericardial effusions. Pt with result of AFB from bronchial culture, not yet speciated; he sees pulm Dr. Marinelli. Pt states he has been feeling very fatigued since discharge from the hospital. Also endorses dyspnea; he has home O2 but is not using it when he leaves his home. Pt was referred for chest PT by inpatient team but pt states he is not interested at this time. He also says he is not interested in doing PT to regain strength. He is compliant with Eliquis. Currently following with Dr. Guadarrama and has repeat echo scheduled for 5/28. Pt still has periorbital swelling. Denies fever, headache, mucositis/odynophagia, chest pain, palpitations, cough, nausea/vomiting, diarrhea/constipation, abdominal pain, LE edema, rash/pruritus, bleeding, muscle or joint pain/weakness. [de-identified] : sq cell ca

## 2025-05-23 NOTE — PHYSICAL EXAM
[Restricted in physically strenuous activity but ambulatory and able to carry out work of a light or sedentary nature] : Status 1- Restricted in physically strenuous activity but ambulatory and able to carry out work of a light or sedentary nature, e.g., light house work, office work [Normal] : normal appearance, no rash, nodules, vesicles, ulcers, erythema [de-identified] : Bilaterial periorbital edema.  [de-identified] : distant but clear

## 2025-05-23 NOTE — ASSESSMENT
[Palliative] : Goals of care discussed with patient: Palliative [FreeTextEntry1] : 69 yo m with Stage IIIb sq cell ca, PDL1 0%.  -s/p CCRT -started maintenance therapy with durvalumab 2/25/20.Developed vesicular rash originally thought to be shingles. Was treated with valacyclovir. This then progressed to disseminated plaque and blister rash which was painful. Dx with bullous pemphigoid. Pt was started on Dupixent by derm with improvement in symptoms. Follow up with derm as scheduled. Pt continues on Dupixent every 2 weeks and is off steroids. Subsequently developed COVID in January and was hospitalized 1/21/21-1/26/21.  Immunotherapy permanently discontinued given severe AEs Scans done 4/15/21 as follows Unchanged right hilar mass and nodular right upper lobe mass. Minimal residual ground glass opacities and interlobular septal thickening improved since 01/22/2021. Emphysema.  Indeterminant right renal hypodensity. Suggest further evaluation with MRI. Infrarenal abdominal aortic aneurysm Pt was lost to follow up after that He is here now after being diagnosed with recurrent disease He was admitted to [facility] on 4/24 with acute hypoxic resp failure- imaging moore demonstrated increased size of R perihilar mass a/w severe narrowing of the R mainstem bronchus. Interventional Pulm was consulted s/p Flex Bronch 4/25 s/p balloon dilation, tumor debunking and BI [id number] Bonastent placement; EBUS guided R hilar mass bx done 4/25/24 bx of right hilar mass consistent with NSCLC, squamous cell carcinoma. PET/CT and MR head reviewed- FDG avid hilar/med mass, no other areas of disease He was started on carbo (retreat) AUC 4 plus Taxol 175 mg/m2 in May 2024 Pt has completed 4 cycles of carbo (retreat) AUC 4 plus Taxol 175 mg/m2  CT scans from this month show decreased size of the right hilar mass.  Case presented at  Palliative intent RT was recommended with goal of locoregional disease control   Ideally, this should be followed by IO. 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  Pt completed RT, 20Gy/5fx which he received from 9/13/2024-9/19/2024. Was gradually improving but had a syncopal event 11/15. No seizures, urinary or fecal incontinence during the episode and was hosptialized. Brain MR did not show acute pathology    He was in the hospital for Feb 2025, He got another bronchial stent placed while he was in the hospital. CT Chest 2/24/25: Right bronchial stent appears patent. Occlusion of the bronchi to the middle and upper lobes with collapse of the corresponding lobes.  Right upper lobe mass with internal areas of necrosis, demonstrating  significant enlargement and further encroachment of the mediastinum since 7/18/2024.   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 Tumor obliterates the azygos arch. Collateral venous  return from the right upper extremity is via azygos collaterals to the IVC. Moderate loculated right pleural effusion. Enlarged ascending thoracic aorta measuring 4.8 cm. Partially thrombosed infrarenal abdominal aortic aneurysm measuring up to 4.7 cm. He was started on anticoagulation for DVT.  PET-CT from April 2025 shows  Enlarging necrotic RIGHT lung mass. Enlarging metastatic lymphadenopathy in the chest. Small focus of uptake within the RIGHT lung suspicious for additional pathology. Proliferated RIGHT lung air bronchograms and RIGHT pleural effusion. Pericardial effusion. Pt with recent hospitalization for SVC syndrome, pleural and pericardial effusions.   Given POD, Keytruda started May 2nd. Dr. Boles discussed with Dr. Bradshaw and OK to start IO  Plan:  - Pt receiving C2 of Keytruda today. -Needs follow up with dermatology, Dr. Bradshaw, given hx of rash on IO -Cough/dyspnea: continue benzonatate to 200mg TID. Continue hycodon PRN. Follow up with Dr. Marinelli. Advised pt to use home O2 when needed. Also advised pt that Chest/pulm PT and home PT may be beneficial d/t feelings of fatigue and dyspnea. Pt defers at this time. - AFB from bronchial culture, not yet speciated, per Dr. Marinelli await speciation; pt strongly advised to f/u with pulmonologist Dr. Marinelli.  - Pericardial effusion: Pt has echo sched 5/28. Advised pt to continue f/u with Dr. Guadarrama. -Periorbital swelling: possibly due to SVC syndrome, cont to monitor   OV with next keytruda.

## 2025-05-23 NOTE — REVIEW OF SYSTEMS
[Fatigue] : fatigue [SOB on Exertion] : shortness of breath during exertion [Negative] : Neurological [Fever] : no fever [Recent Change In Weight] : ~T no recent weight change [Chest Pain] : no chest pain [Palpitations] : no palpitations [Lower Ext Edema] : no lower extremity edema [Wheezing] : no wheezing [Skin Rash] : no skin rash [FreeTextEntry3] : swelling around eyes

## 2025-06-04 NOTE — REVIEW OF SYSTEMS
[FreeTextEntry4] : right ear muffled [FreeTextEntry5] : intermittent chest tightness [FreeTextEntry8] : slow stream

## 2025-06-04 NOTE — HISTORY OF PRESENT ILLNESS
[FreeTextEntry1] : the maximum effort to leave home due to comorbidities.    [FreeTextEntry2] :  Mr. Manning is a 69 y/o man with COPD on home oxygen, HTN, HLD, GERD, Anxiety/depression, Right subclavian/bacheocephalic DVT on eliquis, and stage IV Right lung SCC s/p Right bronchus stent, 7C carbo/abraxane (11/14/19), CCRT (ld 1/3/20), durvalumab, C4 carbo/taxol (ld 9/24/24), 5fxs RT to R lung (ld 9/19/24). Tentatively scheduled to start Keytruda 5/2/25. P/w facial swelling x 2 weeks, notes some blurry vision but now at baseline. Also reports occasional chest tightness, dry cough, dizziness, and poor appetite with 30 lb weight loss. Trops and pro-BNP were negative. CTA chest revealed large Right pleural effusion and mucoid impacted Right main stem bronchial stent and moderate pericardial effusion. s/p bronchoscopy with stent placement revision and thoracentesis of Right pleural effusion with 1.9L removed (culture with NTD and cytology was negative for malignancy). Facial swelling improved. Discharged home without skilled needs.     Mr. Manning is being seen in the home today for post hospitalization follow up. He presents with persistent facial swelling, fatigue, and occasional chest discomfort since hospital discharge on April 26th. He reports that the facial swelling returned as soon as he came home. The swelling is more pronounced on the right side of his face but is present bilaterally. He denies any vision changes or difficulty swallowing. The patient reports feeling very sluggish and having difficulty getting out of bed, which has been ongoing for a while. He uses oxygen as needed, typically at 4 liters, a couple of times a day and at night. The patient describes experiencing chest discomfort, which he characterizes as pressure and achiness, occurring every so often. He rates the pain as a 4 out of 10 when it occurs.  He recently started chemotherapy with Keytruda, which is scheduled every three weeks. The patient reports poor appetite and a 30-pound weight loss prior to his recent hospitalization.    Social History:     - Lives alone     - Has one brother who is concerned about his care     - Declined offer for home health aide assistance     - Expresses desire to maintain cleanliness but struggles due to fatigue

## 2025-06-04 NOTE — PHYSICAL EXAM
[de-identified] : chronically ill, more fatigued [de-identified] : with ambulation +accessory muscle use, right side with slight expiratory wheeze [de-identified] : left hand swelling [de-identified] : ecchymosis bilateral UE

## 2025-06-04 NOTE — CHRONIC CARE ASSESSMENT
[PHQ9 Completed] : PHQ9 not competed [de-identified] : declined [de-identified] : declined SW support

## 2025-06-04 NOTE — HEALTH RISK ASSESSMENT
[de-identified] : unable to manage at this point [FreeTextEntry2] : Syncopal fall in November 2024

## 2025-07-26 NOTE — HISTORY OF PRESENT ILLNESS
[Former] : former [TextBox_4] : Interventional Pulmonology Consultation Note Follow Up Visit with IP:  Jul 28, 2025, 2:30PM    Mr. OSBORNE is a 70-year-old male with PMH of 100 pack year smoking hx, as well as COPD, HTN, GERD, anxiety/depression, and stage IIIB (T4N2M0) SCC of the lung dx in 2019 with dominant R hilar mass with encasement of R pulm arteries. He completed definitive ChemoRT with good response (60Gy/30 fx completed in 1/2020) followed by maintenance Durvalumab- however was stopped in 12/2020 due to AEs. Patient was lost to follow up in 2021.  He was admitted to [facility] on 4/24 with acute hypoxic resp failure- imaging demonstrated the increased size of R perihilar mass a/w severe narrowing of the R mainstem bronchus. Interventional pulmonology team was consulted and he underwent a bronchoscopy with tumor debulking along with stent placement. Once discharged from hospitalization, he was seen by radiation oncology and medical oncology in the outpatient setting and has been compliant with treatment ( four cycles of chemo (Carbo/Taxol) and five frx of RT to the right lung). He was rehospitalized on 01/2025 due to worsening cough, fatigue, and shortness of breath. He was admitted for acute hypoxemic respiratory failure, likely secondary to post-obstructive pneumonia. He was treated with (Ceftriaxone, Azithromycin, DuoNeb, and hypertonic saline nebulizer treatments). He was rehospitalized on 02/24/2025 due to concern for stent occlusion/displacement with progressive symptoms (fatigue, SOB on exertion, cough, and weight loss). During the visit, he underwent a thoracentesis where he had a total of 600cc of exudative/nonspecific fluid removed and underwent a bronchoscopy with stent evaluation with the removal of granulation tissue, balloon dilation, cryotherapy, and aspiration of purulent secretions on 02/28/2025.   Since last seen, he was admitted at [facility] on 06/01/2025-07/01/2025 due to a complaint of acute chronic hypoxic respiratory failure secondary to disease progression. During hospitalization, the patient has undergone multiple procedures (flexible Bronchoscopy with stent revision and thoracentesis on 06/04/2025.  In addition, he had had multiple thoracenteses completed and ultimately had a right-sided Pleurx catheter implemented on 06/27/2025   Procedure history is notable for: -04/25/2024:Flex Bronch 4/25 s/p balloon dilation, tumor debulking, and BI [id number] Bonastent placement: EBUS guided R hilar mass bx done. Results were consistent with NSCLC, squamous cell carcinoma. -06/26/2024: Flexible bronchoscopy/stent evaluation. The result from that Bronchoscopy was notable for Fibrin, blood, and scanty fragments of epithelium with squamous metaplasia. Negative for malignancy. -09/25/2024: Flexible bronchoscopy/stent revision. The results were negative for malignant cells, composed of groups of scattered alveolar macrophages and mixed inflammatory cells, notable for acutely inflamed mucus consistent with an inspissated mucous plug. -12/19/2024: Flexible bronchoscopy/stent revision. The results were negative for malignant cells and showed a moderately cellular specimen composed of groups of reactive and benign ciliated bronchial epithelial cells, scattered alveolar macrophages, and inflammatory cells. -02/25/2025: Right thoracentesis. Total of 600cc of exudative/nonspecific fluid removed. -02/28/2025: Flexible bronchoscopy/stent evaluation. The results showed necrotic material consistent with necrotic carcinoma with scattered viable high-grade tumor cells. -03/04/2025: Suspension laryngoscopy, flex/rigid bronch/balloon dilation/BAL due to significant obstruction of the RMB. Post-procedure removal of his old 10x20 Patricia stent, endobronchial biopsy, and placement of a 10x30 aero stent. -04/24/2025: Flexible bronchoscopy with stent placement revision/thoracentesis. A total of 1.9 L of serous pleural fluid was drained. -06/04/2025: Flexible bronchoscopy/stent evaluation/right thoracentesis, a total of 1.5 L of serous fluid drained. Fluid was negative for malignant cells and consisted of mesothelial cells and histiocytes. Bronchoscopy was notable for keratinizing squamous cell carcinoma. -06/15/2025: Right-sided thoracentesis. 2 L of serous fluid drained. Fluid negative for malignant cells composed of scattered white blood cells, macrophages, and red blood cells. -06/19/2025: Chest tube placed for recurring effusion. Fluid negative for malignant cells consists of benign mesothelial cells and histiocytes. -06/27/2025: Right-sided Pleurx catheter placement.  Today

## 2025-07-26 NOTE — HISTORY OF PRESENT ILLNESS
[Former] : former [TextBox_4] : Interventional Pulmonology Consultation Note Follow Up Visit with IP:  Jul 28, 2025, 2:30PM    Mr. OSBORNE is a 70-year-old male with PMH of 100 pack year smoking hx, as well as COPD, HTN, GERD, anxiety/depression, and stage IIIB (T4N2M0) SCC of the lung dx in 2019 with dominant R hilar mass with encasement of R pulm arteries. He completed definitive ChemoRT with good response (60Gy/30 fx completed in 1/2020) followed by maintenance Durvalumab- however was stopped in 12/2020 due to AEs. Patient was lost to follow up in 2021.  He was admitted to [facility] on 4/24 with acute hypoxic resp failure- imaging demonstrated the increased size of R perihilar mass a/w severe narrowing of the R mainstem bronchus. Interventional pulmonology team was consulted and he underwent a bronchoscopy with tumor debulking along with stent placement. Once discharged from hospitalization, he was seen by radiation oncology and medical oncology in the outpatient setting and has been compliant with treatment ( four cycles of chemo (Carbo/Taxol) and five frx of RT to the right lung). He was rehospitalized on 01/2025 due to worsening cough, fatigue, and shortness of breath. He was admitted for acute hypoxemic respiratory failure, likely secondary to post-obstructive pneumonia. He was treated with (Ceftriaxone, Azithromycin, DuoNeb, and hypertonic saline nebulizer treatments). He was rehospitalized on 02/24/2025 due to concern for stent occlusion/displacement with progressive symptoms (fatigue, SOB on exertion, cough, and weight loss). During the visit, he underwent a thoracentesis where he had a total of 600cc of exudative/nonspecific fluid removed and underwent a bronchoscopy with stent evaluation with the removal of granulation tissue, balloon dilation, cryotherapy, and aspiration of purulent secretions on 02/28/2025.   Since last seen, he was admitted at [facility] on 06/01/2025-07/01/2025 due to a complaint of acute chronic hypoxic respiratory failure secondary to disease progression. During hospitalization, the patient has undergone multiple procedures (flexible Bronchoscopy with stent revision and thoracentesis on 06/04/2025.  In addition, he had had multiple thoracenteses completed and ultimately had a right-sided Pleurx catheter implemented on 06/27/2025   Procedure history is notable for: -04/25/2024:Flex Bronch 4/25 s/p balloon dilation, tumor debulking, and BI [id number] Bonastent placement: EBUS guided R hilar mass bx done. Results were consistent with NSCLC, squamous cell carcinoma. -06/26/2024: Flexible bronchoscopy/stent evaluation. The result from that Bronchoscopy was notable for Fibrin, blood, and scanty fragments of epithelium with squamous metaplasia. Negative for malignancy. -09/25/2024: Flexible bronchoscopy/stent revision. The results were negative for malignant cells, composed of groups of scattered alveolar macrophages and mixed inflammatory cells, notable for acutely inflamed mucus consistent with an inspissated mucous plug. -12/19/2024: Flexible bronchoscopy/stent revision. The results were negative for malignant cells and showed a moderately cellular specimen composed of groups of reactive and benign ciliated bronchial epithelial cells, scattered alveolar macrophages, and inflammatory cells. -02/25/2025: Right thoracentesis. Total of 600cc of exudative/nonspecific fluid removed. -02/28/2025: Flexible bronchoscopy/stent evaluation. The results showed necrotic material consistent with necrotic carcinoma with scattered viable high-grade tumor cells. -03/04/2025: Suspension laryngoscopy, flex/rigid bronch/balloon dilation/BAL due to significant obstruction of the RMB. Post-procedure removal of his old 10x20 Patricia stent, endobronchial biopsy, and placement of a 10x30 aero stent. -04/24/2025: Flexible bronchoscopy with stent placement revision/thoracentesis. A total of 1.9 L of serous pleural fluid was drained. -06/04/2025: Flexible bronchoscopy/stent evaluation/right thoracentesis, a total of 1.5 L of serous fluid drained. Fluid was negative for malignant cells and consisted of mesothelial cells and histiocytes. Bronchoscopy was notable for keratinizing squamous cell carcinoma. -06/15/2025: Right-sided thoracentesis. 2 L of serous fluid drained. Fluid negative for malignant cells composed of scattered white blood cells, macrophages, and red blood cells. -06/19/2025: Chest tube placed for recurring effusion. Fluid negative for malignant cells consists of benign mesothelial cells and histiocytes. -06/27/2025: Right-sided Pleurx catheter placement.  Today

## 2025-07-26 NOTE — PROCEDURE
[Thoracic Ultrasound] : Thoracic Ultrasound [A line] : A line: Yes [Lung sliding] : Lung sliding: Yes [Normal] : Normal [B line] : B line: No [Pleural Effusion] : Pleural Effusion: No [Consolidation] : Consolidation: No [de-identified] : Dyspnea on exertion [FreeTextEntry2] : large effusion to right side of the chest.

## 2025-07-26 NOTE — DISCUSSION/SUMMARY
[FreeTextEntry1] : On 06/15/2025 patient had a thoracentesis on the right-side of the chest where they had 2 L of Serous fluid drained. Thus far to today date fluid is negative for growth bacterial culture, viral smears & culture, and fungal & AFB. In addition to that fluid was noted to be Transudative vs exudative in nature due to: Fluid Cholesterol:-U/L Fluid Total Protein: 3.4 g/dL Fluid Lactate Dehydrogenase: 198 mg/dL Fluid Albumin:-G/dL Fluid pH: 7.9  Fluid Glucose: 145 mg/dL     Peritoneal/pleural/pericardial fluid/other body fluid types:     Tube Type: Sterile Fluid type: Pleural Fluid  Body Fluid Appearance:  Hazy Color - Body Fluid: yellow Total Nucleated Cell, Count Body Fluid: 143 cells/uL  Total RBC Count:    1000 high cells/uL      Neutrophil- Body Fluid: 16 BF Lymphocytes: 74 % Monocyte/Macrophage Count - Body Fluid:    7 %-Final   Eosinophil Count - Body Fluid:      -% -Final    Other Body Cells:   -% Mesothelial Cells - Body Fluid:     2 % -Final      Total Cells Counted, Body Fluid:  100  %-Final      Atypical Lymphocytes - Body Fluid: 1 %-Final          Cytology report was notable for: - NEGATIVE FOR MALIGNANT CELLS. The cytology slides are composed of scattered white blood cells, macrophages and red blood cells.

## 2025-07-26 NOTE — PROCEDURE
[Thoracic Ultrasound] : Thoracic Ultrasound [A line] : A line: Yes [Lung sliding] : Lung sliding: Yes [Normal] : Normal [B line] : B line: No [Pleural Effusion] : Pleural Effusion: No [Consolidation] : Consolidation: No [de-identified] : Dyspnea on exertion [FreeTextEntry2] : large effusion to right side of the chest.

## 2025-07-27 NOTE — REVIEW OF SYSTEMS
[Fatigue] : fatigue [SOB on Exertion] : shortness of breath during exertion [Negative] : Allergic/Immunologic [Fever] : no fever [Recent Change In Weight] : ~T no recent weight change [Chest Pain] : no chest pain [Palpitations] : no palpitations [Lower Ext Edema] : no lower extremity edema [Wheezing] : no wheezing [Skin Rash] : no skin rash [FreeTextEntry3] : swelling around eyes [FreeTextEntry6] : pleurx cath

## 2025-07-27 NOTE — HISTORY OF PRESENT ILLNESS
[Disease: _____________________] : Disease: [unfilled] [T: ___] : T[unfilled] [N: ___] : N[unfilled] [M: ___] : M[unfilled] [AJCC Stage: ____] : AJCC Stage: [unfilled] [Date: ____________] : Patient's last distress assessment performed on [unfilled]. [8 - Distress Level] : Distress Level: 8 [de-identified] : 70 year old male, with past history significant for Hypertension (untreated), GERD, Anxiety, Depression and Smoking (active), COPD, presented to the ED at [facility] on 9/5/19 with progressive shortness of breath and cough - with wheezing and occasional hemoptysis for the past ~ one month. Reports pleuritic type pains of the right lower chest and back with coughing and repositioning of the body. Patient has had an unquantified weight loss over several months (5 lbs per patient, 10 lbs per nephew). CXR at [facility] showed a lung mass. CTA of chest significant for "5.5 x 4.7 cm sized right hilar mass with mediastinal invasion. Encasement of right pulmonary artery and lobar branches as well as central airways with mucosal plug. Possible endobronchial spread to the right upper lobe." Pt was transferred to [facility] for thoracic surgery eval. Patient was seen by thoracic surgery and deemed not a surgical candidate. CT A/P showed scattered liver lesions. Pulmonology was consulted and EBUS with biopsy of the lung mass was performed on 9/10. Rapid cytology showed malignant cells. Further IHC analysis showed P40 pos and TTF1 negative, confirming sq cell histology. PDL1 was 0%.  Of note, brain MRI was normal.  Since discharge, pt has been feeling tired, and continues to have cough and dyspnea. He is trying to maintaining weight.   Pt is a recent smoker. Smoked 2 ppd of cigarettes for ~ 50 years. Last cigarette was on 9/5- has Nictonine patch now. Pt has not seen a doctor for 25 years prior to the recent admission and has not had any prior CT scans.   10/3/19: Not smoking., Started chemo on Tuesday., Feels better already- still some cough. Gained weight. No AE  10/22/19: Pt feeling well. Continued cough. Small weight loss. States appetite fluctuates. Not smoking. Difficulty sleeping due to anxiety and worry.   11/14/19: Pt returns for follow-up. He has completed 7 weeks of carbo/abraxane and is scheduled for XRT sim tomorrow. He will begin CCRT with same agents. He is feeling well. Gained weight. MIld fatigue. Cough improved. denies pain  1/31/2020: Pt returns for follow up. He completed CCRT on 1/3/2020. States he is feeling well. Denies pain/cough/SOB. Mild residual fatigue from time to time. Reports he QUIT SMOKING!!! It has been 2 months since he had a cigarette. States appetite is good despite no weight gain.   5/5/2020: Pt being seen in treatment room today. He is feeling very well. Offers no complaints. Has been on durvalumab since 2/25/2020 and is tolerating welll. Due to Covid Pandemic regimen has been changed to Q month schedule. He denies cough, SOB, headaches, visual changes. S/P CCRT on 1/3/2020.  6/16/20: No new complaints. HAd CT scans last week, Creatinine elevated on recent blood tests. Pt denies any NSAIDs  7/14/2020: Pt returns for follow up and treatment. He is doing well. Tolerating tx wo any iAE. Pt has been on Durvalumab since 2/25/2020. He will complete one year of therapy. Last imaging in June. No new complaints. ROS: non-contributory.   8/11/20: No complaints. Creatinine still elevated. Saw renal and bx was recommended to r/o possible immune related kidney injury  11/3/2020: Pt doing well. Complains of chronic heartburn. Using rolaids/tums. States has no follow up scheduled with nephrology. Pt states has never had an endoscopy or colonoscopy.   12/1/2020: Pt returns for follow up and discussion of findings on recent imaging. He states he is feeling well but has developed a rash on his RUE that is painful and itchy.   12/29/2020: Pt returns for follow up. Last immunotherapy held due to possible irAE skin toxicity. Started on prednisone 40mg daily. Pt has since developed severe plaque/blister rash. Saw derm had biopsy...results pending. Pt developed profound SOB/ Cough. No fevers.  4/8/21: Pt returns for follow up. Since last visit patient has had multiple admissions to the hospital. In dec he was admitted for severe immune-mediated skin and lung toxicity. He was seen by derm and diagnosed with bullous pemphigoid. He continues to follow with derm and was recently started on Dupixant. He remains on Prednisone 10mg daily. Shortly after discharge he developed severe COVID infection and was hospitalized again 1/21/21-1/26/21. He was evaluated by the [facility] but has not maintained follow up. She continues to struggle with profound fatigue and significant GARCIAS. He has been off immunotherapy since 12/15/20.   5/13/21: Cutaneous lesions have markedly improved. Continues to follow with Dr. Bradshaw. Gained weight. Feels well. No complaints  9/17/21: Cutaneous lesions continue to improve- right now just some induration and discoloration. Appetite good and gained weight.   5/17/24: Pt was lost to follow up. He was most recently admitted to [facility] on 4/24 with acute hypoxic resp failure- imaging moore demonstrated increased size of R perihilar mass a/w severe narrowing of the R mainstem bronchus. Interventional Pulm was consulted s/p Flex Bronch 4/25 s/p balloon dilation, tumor debunking and BI [id number] Bonastent placement; EBUS guided R hilar mass bx done. bx of right hilar mass consistent with NSCLC, squamous cell carcinoma.  CTa chest 4/25 showed a 6.8 x 4.3 cm R perihilar mass that had increased in size. CT A/P from 5/1 showed no evidence disease in the abdomen or pelvis.  Seen by rad onc inpatient and nothing to be done at that time, outpatient palliative RT. Scheduled to see med onc 5/17/24. He complains of fatigue, weakness and mild intermittent discomfort in the R chest. He also complains of a productive cough, but improved sob and wheeze. He denies hemoptysis and dysphagia, currently tolerating diet well.    6/14/24: Pt is feeling well. he has decreased appetite and has lost some weight. cough and dyspnea improved. Voice has improved/   7/5/24: Patient seen today for follow up while receiving treatment. He reports some fatigue and constipation that is relieved with docusate following chemo. He has also been having cough. Last week he had bronch and stent was cleaned. Appetite is stable, no N/V/D.   7/26/24: Patient seen today for follow up while receiving treatment. He reports overall feeling well. Breathing is stable and he denies N/V/D.   8/15/24: Doing well. Feeling better. Respiratory symptoms have improved.. He has gained weight. Completed 4 cycle of chemo (Carbo/taxol)  9/24/24: Patient seen today for follow up. He has completed 5 frx of RT to the right lung. He reports experiencing fatigue following RT. He continues to have cough despite benzonatate. He experiences SOB when coughing but otherwise breathing is stable. He denies N/V/D, F/C.   10/23/24: Patient seen today for follow up. He reports that he is having a slight intermittent discomfort on the anterior right chest wall that predated RT. He has ongoing cough benzonatate is somewhat helpful. He reports feeling fatigue and tired the past two days but denies rhinorrhea, sore throat, fever, chills, changes in his breathing. He reached out to pulm yesterday and was ordered a CXR which he will have done after our visit today.   11/19/24: Pt here today for follow up. He was recently hospitalized post fall and trauma to head. He underwent some work up. PE study which was negative. CT head with no acute findings. He is home now, and has not had recurrence of the symptoms.   12/11/24: Patient seen today for follow up. Since last visit he has not had any other episodes of syncope. He has seen cardiology, Dr. Guadarrama, and is currently wearing a heart monitor. He had brain MR done that did not show acute pathology. He was supposed to see neuro onc however he cancelled the appointment because he had a cold. His cough is persisting, breathing unchanged. He has seen Dr. Marinelli and is planned for bronch next week. He plans to see his PCP this week for clearance. He states after RT, he felt generally unwell and weak, improving slowly.   1/15/25: Patient seen today for follow up. Upon presentation he reports that he feels weak, sluggish, tired, nauseous, occasional dizziness, and is having substernal chest pain since having endobronchial stent procedure 3-4 weeks ago. VSS in office, HR in the 90s. Of note, he was wearing a heart monitor for evaluation of syncope with Thierry. Per chart note from Dr. Guadarrama "Zio monitor showed 3 second pause and there was sinus bradycardia". There was a plan for outpatient management with EP. However given patients symptoms and presentation today, we will send patient to ER. He feels his breathing is stable, SOB with exertion but not as rest. Continues to have cough and continues on benzonatate.   4/22/25: Patient seen today for follow up. Upon presentation he reports facial swelling, periorbital edema, RUE swelling, right and left chest pain. Breathing is unchanged, still experiencing GARCIAS that limits ambulation.   5/23/25: Patient seen for follow up while receiving C2 of Pembro. Pt was recently hospitalized for SVC syndrome, pleural and pericardial effusions. Pt with result of AFB from bronchial culture, not yet speciated; he sees pulm Dr. Marinelli. Pt states he has been feeling very fatigued since discharge from the hospital. Also endorses dyspnea; he has home O2 but is not using it when he leaves his home. Pt was referred for chest PT by inpatient team but pt states he is not interested at this time. He also says he is not interested in doing PT to regain strength. He is compliant with Eliquis. Currently following with Dr. Guadarrama and has repeat echo scheduled for 5/28. Pt still has periorbital swelling. Denies fever, headache, mucositis/odynophagia, chest pain, palpitations, cough, nausea/vomiting, diarrhea/constipation, abdominal pain, LE edema, rash/pruritus, bleeding, muscle or joint pain/weakness.  7/25/25: Pt is here for follow up. He had a prolonged hospitalization (6/1/25 to 7/1/25) for acute on chronic hypoxic respiratory failure, sirs. Work up showed SVC and bronchial occocclusion by tumor, MRSA bacteremia, pericardial effusion requiring drainage. Interventions included aggressive airway clearance measures with endobronchial debulking, IV antibiotics (vanc and meropenem for MRSA bacteremia), corticosteroid therapy, and supplemental oxygen. At one point, he even required pressor support with midodrine. .His oxygen requirements improved from high-flow nasal cannula (HFNC) to 4L via nasal cannula at the time of discharge. A PleurX catheter was placed prior to discharge. underwent pericardiocentesis for pericardial effusion.  He was evaluated by physical therapy, occupational therapy, and a registered dietician. He was discharged to SNF and presents for follow up from there today. He has opted for DNR/DNI but is interested in continuing cancer tx. He notes that his condition is steadily improving in the rehab. He has pleurx drainage that is consistently decreasing- currently still being drained TIW. He is on O2 supplementation at 4L/min via NC.   Pt's main concern today is persistent swelling of the arms. He denies any dyspnea at rest or CP or other pulm symptoms. He has a port and pt denies any pain or discomfort in that area. Pt wishes to continue treatment with Keytruda.  [de-identified] : sq cell ca

## 2025-07-27 NOTE — HISTORY OF PRESENT ILLNESS
[Disease: _____________________] : Disease: [unfilled] [T: ___] : T[unfilled] [N: ___] : N[unfilled] [M: ___] : M[unfilled] [AJCC Stage: ____] : AJCC Stage: [unfilled] [Date: ____________] : Patient's last distress assessment performed on [unfilled]. [8 - Distress Level] : Distress Level: 8 [de-identified] : 70 year old male, with past history significant for Hypertension (untreated), GERD, Anxiety, Depression and Smoking (active), COPD, presented to the ED at [place] on 9/5/19 with progressive shortness of breath and cough - with wheezing and occasional hemoptysis for the past ~ one month. Reports pleuritic type pains of the right lower chest and back with coughing and repositioning of the body. Patient has had an unquantified weight loss over several months (5 lbs per patient, 10 lbs per nephew). CXR at [place] showed a lung mass. CTA of chest significant for "5.5 x 4.7 cm sized right hilar mass with mediastinal invasion. Encasement of right pulmonary artery and lobar branches as well as central airways with mucosal plug. Possible endobronchial spread to the right upper lobe." Pt was transferred to [facility] for thoracic surgery eval. Patient was seen by thoracic surgery and deemed not a surgical candidate. CT A/P showed scattered liver lesions. Pulmonology was consulted and EBUS with biopsy of the lung mass was performed on 9/10. Rapid cytology showed malignant cells. Further IHC analysis showed P40 pos and TTF1 negative, confirming sq cell histology. PDL1 was 0%.  Of note, brain MRI was normal.  Since discharge, pt has been feeling tired, and continues to have cough and dyspnea. He is trying to maintaining weight.   Pt is a recent smoker. Smoked 2 ppd of cigarettes for ~ 50 years. Last cigarette was on 9/5- has Nictonine patch now. Pt has not seen a doctor for 25 years prior to the recent admission and has not had any prior CT scans.   10/3/19: Not smoking., Started chemo on Tuesday., Feels better already- still some cough. Gained weight. No AE  10/22/19: Pt feeling well. Continued cough. Small weight loss. States appetite fluctuates. Not smoking. Difficulty sleeping due to anxiety and worry.   11/14/19: Pt returns for follow-up. He has completed 7 weeks of carbo/abraxane and is scheduled for XRT sim tomorrow. He will begin CCRT with same agents. He is feeling well. Gained weight. MIld fatigue. Cough improved. denies pain  1/31/2020: Pt returns for follow up. He completed CCRT on 1/3/2020. States he is feeling well. Denies pain/cough/SOB. Mild residual fatigue from time to time. Reports he QUIT SMOKING!!! It has been 2 months since he had a cigarette. States appetite is good despite no weight gain.   5/5/2020: Pt being seen in treatment room today. He is feeling very well. Offers no complaints. Has been on durvalumab since 2/25/2020 and is tolerating welll. Due to Covid Pandemic regimen has been changed to Q month schedule. He denies cough, SOB, headaches, visual changes. S/P CCRT on 1/3/2020.  6/16/20: No new complaints. HAd CT scans last week, Creatinine elevated on recent blood tests. Pt denies any NSAIDs  7/14/2020: Pt returns for follow up and treatment. He is doing well. Tolerating tx wo any iAE. Pt has been on Durvalumab since 2/25/2020. He will complete one year of therapy. Last imaging in June. No new complaints. ROS: non-contributory.   8/11/20: No complaints. Creatinine still elevated. Saw renal and bx was recommended to r/o possible immune related kidney injury  11/3/2020: Pt doing well. Complains of chronic heartburn. Using rolaids/tums. States has no follow up scheduled with nephrology. Pt states has never had an endoscopy or colonoscopy.   12/1/2020: Pt returns for follow up and discussion of findings on recent imaging. He states he is feeling well but has developed a rash on his RUE that is painful and itchy.   12/29/2020: Pt returns for follow up. Last immunotherapy held due to possible irAE skin toxicity. Started on prednisone 40mg daily. Pt has since developed severe plaque/blister rash. Saw derm had biopsy...results pending. Pt developed profound SOB/ Cough. No fevers.  4/8/21: Pt returns for follow up. Since last visit patient has had multiple admissions to the hospital. In dec he was admitted for severe immune-mediated skin and lung toxicity. He was seen by derm and diagnosed with bullous pemphigoid. He continues to follow with derm and was recently started on Dupixant. He remains on Prednisone 10mg daily. Shortly after discharge he developed severe COVID infection and was hospitalized again 1/21/21-1/26/21. He was evaluated by the [facility] but has not maintained follow up. She continues to struggle with profound fatigue and significant GARCIAS. He has been off immunotherapy since 12/15/20.   5/13/21: Cutaneous lesions have markedly improved. Continues to follow with Dr. Bradshaw. Gained weight. Feels well. No complaints  9/17/21: Cutaneous lesions continue to improve- right now just some induration and discoloration. Appetite good and gained weight.   5/17/24: Pt was lost to follow up. He was most recently admitted to [facility] on 4/24 with acute hypoxic resp failure- imaging moore demonstrated increased size of R perihilar mass a/w severe narrowing of the R mainstem bronchus. Interventional Pulm was consulted s/p Flex Bronch 4/25 s/p balloon dilation, tumor debunking and BI [id number] Bonastent placement; EBUS guided R hilar mass bx done. bx of right hilar mass consistent with NSCLC, squamous cell carcinoma.  CTa chest 4/25 showed a 6.8 x 4.3 cm R perihilar mass that had increased in size. CT A/P from 5/1 showed no evidence disease in the abdomen or pelvis.  Seen by rad onc inpatient and nothing to be done at that time, outpatient palliative RT. Scheduled to see med onc 5/17/24. He complains of fatigue, weakness and mild intermittent discomfort in the R chest. He also complains of a productive cough, but improved sob and wheeze. He denies hemoptysis and dysphagia, currently tolerating diet well.    6/14/24: Pt is feeling well. he has decreased appetite and has lost some weight. cough and dyspnea improved. Voice has improved/   7/5/24: Patient seen today for follow up while receiving treatment. He reports some fatigue and constipation that is relieved with docusate following chemo. He has also been having cough. Last week he had bronch and stent was cleaned. Appetite is stable, no N/V/D.   7/26/24: Patient seen today for follow up while receiving treatment. He reports overall feeling well. Breathing is stable and he denies N/V/D.   8/15/24: Doing well. Feeling better. Respiratory symptoms have improved.. He has gained weight. Completed 4 cycle of chemo (Carbo/taxol)  9/24/24: Patient seen today for follow up. He has completed 5 frx of RT to the right lung. He reports experiencing fatigue following RT. He continues to have cough despite benzonatate. He experiences SOB when coughing but otherwise breathing is stable. He denies N/V/D, F/C.   10/23/24: Patient seen today for follow up. He reports that he is having a slight intermittent discomfort on the anterior right chest wall that predated RT. He has ongoing cough benzonatate is somewhat helpful. He reports feeling fatigue and tired the past two days but denies rhinorrhea, sore throat, fever, chills, changes in his breathing. He reached out to pulm yesterday and was ordered a CXR which he will have done after our visit today.   11/19/24: Pt here today for follow up. He was recently hospitalized post fall and trauma to head. He underwent some work up. PE study which was negative. CT head with no acute findings. He is home now, and has not had recurrence of the symptoms.   12/11/24: Patient seen today for follow up. Since last visit he has not had any other episodes of syncope. He has seen cardiology, Dr. Guadarrama, and is currently wearing a heart monitor. He had brain MR done that did not show acute pathology. He was supposed to see neuro onc however he cancelled the appointment because he had a cold. His cough is persisting, breathing unchanged. He has seen Dr. Marinelli and is planned for bronch next week. He plans to see his PCP this week for clearance. He states after RT, he felt generally unwell and weak, improving slowly.   1/15/25: Patient seen today for follow up. Upon presentation he reports that he feels weak, sluggish, tired, nauseous, occasional dizziness, and is having substernal chest pain since having endobronchial stent procedure 3-4 weeks ago. VSS in office, HR in the 90s. Of note, he was wearing a heart monitor for evaluation of syncope with Thierry. Per chart note from Dr. Guadarrama "Zio monitor showed 3 second pause and there was sinus bradycardia". There was a plan for outpatient management with EP. However given patients symptoms and presentation today, we will send patient to ER. He feels his breathing is stable, SOB with exertion but not as rest. Continues to have cough and continues on benzonatate.   4/22/25: Patient seen today for follow up. Upon presentation he reports facial swelling, periorbital edema, RUE swelling, right and left chest pain. Breathing is unchanged, still experiencing GARCIAS that limits ambulation.   5/23/25: Patient seen for follow up while receiving C2 of Pembro. Pt was recently hospitalized for SVC syndrome, pleural and pericardial effusions. Pt with result of AFB from bronchial culture, not yet speciated; he sees pulm Dr. Marinelli. Pt states he has been feeling very fatigued since discharge from the hospital. Also endorses dyspnea; he has home O2 but is not using it when he leaves his home. Pt was referred for chest PT by inpatient team but pt states he is not interested at this time. He also says he is not interested in doing PT to regain strength. He is compliant with Eliquis. Currently following with Dr. Guadarrama and has repeat echo scheduled for 5/28. Pt still has periorbital swelling. Denies fever, headache, mucositis/odynophagia, chest pain, palpitations, cough, nausea/vomiting, diarrhea/constipation, abdominal pain, LE edema, rash/pruritus, bleeding, muscle or joint pain/weakness.  7/25/25: Pt is here for follow up. He had a prolonged hospitalization (6/1/25 to 7/1/25) for acute on chronic hypoxic respiratory failure, sirs. Work up showed SVC and bronchial occocclusion by tumor, MRSA bacteremia, pericardial effusion requiring drainage. Interventions included aggressive airway clearance measures with endobronchial debulking, IV antibiotics (vanc and meropenem for MRSA bacteremia), corticosteroid therapy, and supplemental oxygen. At one point, he even required pressor support with midodrine. .His oxygen requirements improved from high-flow nasal cannula (HFNC) to 4L via nasal cannula at the time of discharge. A PleurX catheter was placed prior to discharge. underwent pericardiocentesis for pericardial effusion.  He was evaluated by physical therapy, occupational therapy, and a registered dietician. He was discharged to SNF and presents for follow up from there today. He has opted for DNR/DNI but is interested in continuing cancer tx. He notes that his condition is steadily improving in the rehab. He has pleurx drainage that is consistently decreasing- currently still being drained TIW. He is on O2 supplementation at 4L/min via NC.   Pt's main concern today is persistent swelling of the arms. He denies any dyspnea at rest or CP or other pulm symptoms. He has a port and pt denies any pain or discomfort in that area. Pt wishes to continue treatment with Keytruda.  [de-identified] : sq cell ca

## 2025-07-27 NOTE — PHYSICAL EXAM
[Restricted in physically strenuous activity but ambulatory and able to carry out work of a light or sedentary nature] : Status 1- Restricted in physically strenuous activity but ambulatory and able to carry out work of a light or sedentary nature, e.g., light house work, office work [Normal] : affect appropriate [de-identified] : Bilaterial periorbital edema.  [de-identified] : distant but clear

## 2025-07-27 NOTE — ASSESSMENT
[Palliative] : Goals of care discussed with patient: Palliative [FreeTextEntry1] : 71 yo m with Stage IIIb sq cell ca, PDL1 0%.  -s/p CCRT ended early 2020 -started maintenance therapy with durvalumab 2/25/20.Developed vesicular rash originally thought to be shingles. Was treated with valacyclovir. This then progressed to disseminated plaque and blister rash which was painful. Dx with bullous pemphigoid. Pt was started on Dupixent by derm with improvement in symptoms. Follow up with derm as scheduled. Pt continues on Dupixent every 2 weeks and is off steroids. Subsequently developed COVID in January and was hospitalized 1/21/21-1/26/21.  Immunotherapy permanently discontinued given severe AEs Scans done 4/15/21 as follows Unchanged right hilar mass and nodular right upper lobe mass. Minimal residual ground glass opacities and interlobular septal thickening improved since 01/22/2021. Emphysema.  Indeterminant right renal hypodensity. Suggest further evaluation with MRI. Infrarenal abdominal aortic aneurysm Pt was lost to follow up after that He is here now after being diagnosed with recurrent disease He was admitted to [facility] on [facility]/24/24 with acute hypoxic resp failure- imaging moore demonstrated increased size of R perihilar mass a/w severe narrowing of the R mainstem bronchus. Interventional Pulm was consulted s/p Flex Bronch 4/25 s/p balloon dilation, tumor debunking and BI [id number] Bonastent placement; EBUS guided R hilar mass bx done 4/25/24 bx of right hilar mass consistent with NSCLC, squamous cell carcinoma. PET/CT and MR head reviewed- FDG avid hilar/med mass, no other areas of disease He was started on carbo (retreat) AUC 4 plus Taxol 175 mg/m2 in May 2024 Pt has completed 4 cycles of carbo (retreat) AUC 4 plus Taxol 175 mg/m2  CT scans from this month show decreased size of the right hilar mass.  Case presented at [facility] Palliative intent RT was recommended with goal of locoregional disease control   Ideally, this should be followed by IO. 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  Pt completed RT, 20Gy/5fx which he received from 9/13/2024-9/19/2024. Was gradually improving but had a syncopal event 11/15. No seizures, urinary or fecal incontinence during the episode and was hosptialized. Brain MR did not show acute pathology    He was in the hospital for worsening dyspnea Feb 2025, He got another bronchial stent placed while he was in the hospital. CT Chest 2/24/25: Right bronchial stent appears patent. Occlusion of the bronchi to the middle and upper lobes with collapse of the corresponding lobes.  Right upper lobe mass with internal areas of necrosis, demonstrating  significant enlargement and further encroachment of the mediastinum since 7/18/2024.   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 Tumor obliterates the azygos arch. Collateral venous  return from the right upper extremity is via azygos collaterals to the IVC. Moderate loculated right pleural effusion. Enlarged ascending thoracic aorta measuring 4.8 cm. Partially thrombosed infrarenal abdominal aortic aneurysm measuring up to 4.7 cm. He was started on anticoagulation for DVT.  PET-CT from April 2025 showed enlarging necrotic RIGHT lung mass. Enlarging metastatic lymphadenopathy in the chest. Small focus of uptake within the RIGHT lung suspicious for additional pathology. Proliferated RIGHT lung air bronchograms and RIGHT pleural effusion. Pericardial effusion. Pt with recent hospitalization for SVC syndrome, pleural and pericardial effusions.   Given POD, Keytruda started May 2nd.  However, new admission, this time prolonged between 6/1/25 and 7/1/25- complicated course Interventions included endobronchial debridement of cancer, drainage of pleural and pericardial effusions (now with pleural cath), pressor support and high O2 requirement.  Pt is now in rehab- with O2 support via nasal cannula at 4L/min, pleural drainage is improving PS and symptoms continue to improve He has opted for DNR/DNI, but is interested in continuing tx Since events happened soon after starting Keytruda, we believe this does not reflect IO failure.  This was also supported by the fact that none of the fluid cytology specimens showed any malignant cells, suggesting that they were a result of SVC compression. Endobronchial bx did show sq cell ca- suggesitng persistent disease. PDL 1 was neg.  There has been no irAEs thus far Pt is too frail to receive any cytotoxic therapies Therefore, discussed continuing with pembro which pt was in agreement with.  Aggressive symptom management Maintain follow up with all subspecialists Periorbital swelling: possibly due to SVC syndrome, cont to monitor   OV with next keytruda.  Since there is high risk for exacerbation of pulm disease, will get short interval scans If POD continues, will discuss futility of further tx and encourage pt to consider supportive care through hospice services.

## 2025-07-27 NOTE — ASSESSMENT
[Palliative] : Goals of care discussed with patient: Palliative [FreeTextEntry1] : 69 yo m with Stage IIIb sq cell ca, PDL1 0%.  -s/p CCRT ended early 2020 -started maintenance therapy with durvalumab 2/25/20.Developed vesicular rash originally thought to be shingles. Was treated with valacyclovir. This then progressed to disseminated plaque and blister rash which was painful. Dx with bullous pemphigoid. Pt was started on Dupixent by derm with improvement in symptoms. Follow up with derm as scheduled. Pt continues on Dupixent every 2 weeks and is off steroids. Subsequently developed COVID in January and was hospitalized 1/21/21-1/26/21.  Immunotherapy permanently discontinued given severe AEs Scans done 4/15/21 as follows Unchanged right hilar mass and nodular right upper lobe mass. Minimal residual ground glass opacities and interlobular septal thickening improved since 01/22/2021. Emphysema.  Indeterminant right renal hypodensity. Suggest further evaluation with MRI. Infrarenal abdominal aortic aneurysm Pt was lost to follow up after that He is here now after being diagnosed with recurrent disease He was admitted to [facility] on 4/24/24 with acute hypoxic resp failure- imaging moore demonstrated increased size of R perihilar mass a/w severe narrowing of the R mainstem bronchus. Interventional Pulm was consulted s/p Flex Bronch 4/25 s/p balloon dilation, tumor debunking and BI [id number] Bonastent placement; EBUS guided R hilar mass bx done 4/25/24 bx of right hilar mass consistent with NSCLC, squamous cell carcinoma. PET/CT and MR head reviewed- FDG avid hilar/med mass, no other areas of disease He was started on carbo (retreat) AUC 4 plus Taxol 175 mg/m2 in May 2024 Pt has completed 4 cycles of carbo (retreat) AUC 4 plus Taxol 175 mg/m2  CT scans from this month show decreased size of the right hilar mass.  Case presented at [facility] Palliative intent RT was recommended with goal of locoregional disease control   Ideally, this should be followed by IO. 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  Pt completed RT, 20Gy/5fx which he received from 9/13/2024-9/19/2024. Was gradually improving but had a syncopal event 11/15. No seizures, urinary or fecal incontinence during the episode and was hosptialized. Brain MR did not show acute pathology    He was in the hospital for worsening dyspnea Feb 2025, He got another bronchial stent placed while he was in the hospital. CT Chest 2/24/25: Right bronchial stent appears patent. Occlusion of the bronchi to the middle and upper lobes with collapse of the corresponding lobes.  Right upper lobe mass with internal areas of necrosis, demonstrating  significant enlargement and further encroachment of the mediastinum since 7/18/2024.   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 Tumor obliterates the azygos arch. Collateral venous  return from the right upper extremity is via azygos collaterals to the IVC. Moderate loculated right pleural effusion. Enlarged ascending thoracic aorta measuring 4.8 cm. Partially thrombosed infrarenal abdominal aortic aneurysm measuring up to 4.7 cm. He was started on anticoagulation for DVT.  PET-CT from April 2025 showed enlarging necrotic RIGHT lung mass. Enlarging metastatic lymphadenopathy in the chest. Small focus of uptake within the RIGHT lung suspicious for additional pathology. Proliferated RIGHT lung air bronchograms and RIGHT pleural effusion. Pericardial effusion. Pt with recent hospitalization for SVC syndrome, pleural and pericardial effusions.   Given POD, Keytruda started May 2nd.  However, new admission, this time prolonged between 6/1/25 and 7/1/25- complicated course Interventions included endobronchial debridement of cancer, drainage of pleural and pericardial effusions (now with pleural cath), pressor support and high O2 requirement.  Pt is now in rehab- with O2 support via nasal cannula at 4L/min, pleural drainage is improving PS and symptoms continue to improve He has opted for DNR/DNI, but is interested in continuing tx Since events happened soon after starting Keytruda, we believe this does not reflect IO failure.  This was also supported by the fact that none of the fluid cytology specimens showed any malignant cells, suggesting that they were a result of SVC compression. Endobronchial bx did show sq cell ca- suggesitng persistent disease. PDL 1 was neg.  There has been no irAEs thus far Pt is too frail to receive any cytotoxic therapies Therefore, discussed continuing with pembro which pt was in agreement with.  Aggressive symptom management Maintain follow up with all subspecialists Periorbital swelling: possibly due to SVC syndrome, cont to monitor   OV with next keytruda.  Since there is high risk for exacerbation of pulm disease, will get short interval scans If POD continues, will discuss futility of further tx and encourage pt to consider supportive care through hospice services.

## 2025-07-27 NOTE — PHYSICAL EXAM
[Restricted in physically strenuous activity but ambulatory and able to carry out work of a light or sedentary nature] : Status 1- Restricted in physically strenuous activity but ambulatory and able to carry out work of a light or sedentary nature, e.g., light house work, office work [Normal] : affect appropriate [de-identified] : Bilaterial periorbital edema.  [de-identified] : distant but clear